# Patient Record
Sex: FEMALE | Race: WHITE | URBAN - METROPOLITAN AREA
[De-identification: names, ages, dates, MRNs, and addresses within clinical notes are randomized per-mention and may not be internally consistent; named-entity substitution may affect disease eponyms.]

---

## 2017-07-23 ENCOUNTER — HOSPITAL ENCOUNTER (INPATIENT)
Facility: CLINIC | Age: 22
LOS: 2 days | Discharge: HOME | DRG: 885 | End: 2017-07-26
Attending: EMERGENCY MEDICINE | Admitting: PSYCHIATRY & NEUROLOGY
Payer: COMMERCIAL

## 2017-07-23 DIAGNOSIS — F32.9 MAJOR DEPRESSIVE DISORDER, SINGLE EPISODE, UNSPECIFIED: ICD-10-CM

## 2017-07-23 DIAGNOSIS — R45.851 SUICIDAL IDEATION: ICD-10-CM

## 2017-07-23 PROCEDURE — 99285 EMERGENCY DEPT VISIT HI MDM: CPT | Mod: 25 | Performed by: EMERGENCY MEDICINE

## 2017-07-23 PROCEDURE — 99285 EMERGENCY DEPT VISIT HI MDM: CPT | Mod: Z6 | Performed by: EMERGENCY MEDICINE

## 2017-07-23 NOTE — IP AVS SNAPSHOT
MRN:[id number]                      After Visit Summary   7/23/2017    Sarah Cheema    MRN: [id number]           Thank you!     Thank you for choosing [facility] for your care. Our goal is always to provide you with excellent care.        Patient Information     Date Of Birth          1995        Designated Caregiver       Most Recent Value    Caregiver    Will someone help with your care after discharge? no      About your hospital stay     You were admitted on:  July 24, 2017 You last received care in the:  [facility]    You were discharged on:  July 26, 2017       Who to Call     For medical emergencies, please call 911.  For non-urgent questions about your medical care, please call your primary care provider or clinic, [phone]          Attending Provider     Provider Specialty    Bee Webster MD Emergency Medicine    [facility], Frank Swift MD Psychiatry       Primary Care Provider Office Phone # Fax #    [facility] [phone]      Further instructions from your care team       Behavioral Discharge Planning and Instructions    Summary: You were admitted to station 10 due to increased suicidal ideations. During your hospitalization, you met daily with the staff and were encouraged to attend all therapeutic programming. You met with the Clinical Treatment Coordinator and participated in your discharge planning. You are now stabilized and are discharged home.  Referrals and recommendations are listed below.   Primary Diagnosis:  Major Depressive Disorder     Major Treatments, Procedures and Findings: The patient participated in the therapeutic milieu and groups. The patient learned and practiced positive coping strategies. The patient was assessed for mental health and medication needs.  Medications were adjusted based on the identified needs.    Symptoms to Report: feeling more aggressive, increased confusion, losing more sleep, mood getting  worse or thoughts of suicide    Lifestyle Adjustment: Adjust your lifestyle to get enough sleep, relaxation, exercise and  good nutrition. Continue to develop healthy coping skills to decrease stress and promote a healthy living environment. No use of alcohol, illegal drugs or addictive medications other than what is currently prescribed. Attend all your appointments and take your medications as prescribed.    Psychiatry Follow-up:     [facility].   [address] [place] [address] [address], [place] [address]  Ph: [phone]  Fax: [phone]  [facility] TO FAX DISCHARGE INSTRUCTIONS   Psychiatric medication provider: Dr. Jesus Blum   *You have an initial appointment with Dr. Jesus Blum, scheduled for Wednesday, August 9th at 1:00 pm, however, please arrive at 12:30 pm to complete paperwork for intake appointment. Please bring insurance card and co-pay (if applicable) with you to this appointment.     [facility]   [address] [place] [address]  Ph: [phone]  [email or web address]   Therapist: Jeanette Moeller   *You have a reoccurring therapy appointment with your individual therapist, Jeanette Moeller scheduled for Mondays at 6:00 pm.      Resources:   Crisis Intervention: 963.292.5643 or 645-658-5781 (TTY: 636.389.2458).  Call anytime for help.  [place] on [place] Illness (www.[place].klarissa.org): 735.773.2517 or 516-289-5267.  Alcoholics Anonymous (www.alcoholics-anonymous.org): Check your phone book for your local chapter.  Suicide Awareness Voices of Education (SAVE) (www.save.org): 786-294-WIIR (8883)  National Suicide Prevention Line (www.mentalhealthmn.org): 122-693-FZYW (3036)  Mental Health Consumer/Survivor Network of [place] (www.mhcsn.net): 174.245.5894 or 166-746-5388  Mental Health Association of [place] (www.mentalhealth.org): 444.352.4844 or 524-428-1815    General Medication Instructions:   See your medication sheet(s) for instructions.  "  Take all medicines as directed.  Make no changes unless your doctor suggests them.   Go to all your doctor visits.  Be sure to have all your required lab tests. This way, your medicines can be refilled on time.  Do not use any drugs not prescribed by your doctor.  Avoid alcohol.    The treatment team has appreciated the opportunity to work with you.  We wish you the best in the future. If you have any questions or concerns our unit number is [phone].     Pending Results     Date and Time Order Name Status Description    7/25/2017 0030 Escitalopram Serum or Plasma In process     7/25/2017 0030 Bupropion level In process             Admission Information     Date & Time Provider Department Dept. Phone    7/23/2017 Frank Ramos MD [facility] [phone]      Your Vitals Were     Blood Pressure Pulse Temperature Respirations Height Weight    114/61 71 98.1  F (36.7  C) (Oral) 16 1.575 m (5' 2\") 64.9 kg (143 lb)    Pulse Oximetry BMI (Body Mass Index)                100% 26.16 kg/m2          Care EveryWhere ID     This is your Care EveryWhere ID. This could be used by other organizations to access your [facility] medical records  [id number]        Equal Access to Services     FELY FAYE AH: Hadii hali Mancia, waaxda luqadaha, qaybta kaalmada adecrissyyada, reanna peters. So [facility] [phone].    ATENCIÓN: Si habla español, tiene a coleman disposición servicios gratuitos de asistencia lingüística. Geoffrey al [phone].    We comply with applicable federal civil rights laws and [place] laws. We do not discriminate on the basis of race, color, national origin, age, disability sex, sexual orientation or gender identity.               Review of your medicines      CONTINUE these medicines which may have CHANGED, or have new prescriptions. If we are uncertain of the size of tablets/capsules you have at home, strength may be listed as something that might have changed.        " Dose / Directions    escitalopram 20 MG tablet   Commonly known as:  LEXAPRO   Indication:  Depression   This may have changed:    - medication strength  - how much to take   Used for:  Major depressive disorder, single episode, unspecified        Dose:  20 mg   Start taking on:  7/27/2017   Take 1 tablet (20 mg) by mouth daily   Quantity:  30 tablet   Refills:  3       lamoTRIgine 25 MG tablet   Commonly known as:  LaMICtal   Indication:  Depression   This may have changed:    - medication strength  - when to take this   Used for:  Major depressive disorder, single episode, unspecified        Dose:  50 mg   Take 2 tablets (50 mg) by mouth At Bedtime   Quantity:  60 tablet   Refills:  3         CONTINUE these medicines which have NOT CHANGED        Dose / Directions    desogestrel-ethinyl estradiol 0.15-0.02/0.01 MG (21/5) per tablet   Commonly known as:  KARIVA   Indication:  Birth Control        Dose:  1 tablet   Take 1 tablet by mouth daily   Refills:  0       fluticasone 27.5 MCG/SPRAY spray   Commonly known as:  VERAMYST        Dose:  2 spray   Spray 2 sprays into both nostrils 2 times daily   Refills:  0       fluticasone-salmeterol 250-50 MCG/DOSE diskus inhaler   Commonly known as:  ADVAIR        Dose:  1 puff   Inhale 1 puff into the lungs 2 times daily   Refills:  0       Ipratropium-Albuterol  MCG/ACT inhaler   Commonly known as:  COMBIVENT RESPIMAT        Dose:  1 puff   Inhale 1 puff into the lungs as needed for shortness of breath / dyspnea or wheezing   Refills:  0       WELLBUTRIN XL PO        Dose:  150 mg   Take 150 mg by mouth daily   Refills:  0       XYZAL PO        Dose:  10 mg   Take 10 mg by mouth daily   Refills:  0            Where to get your medicines      These medications were sent to [facility] [address] [place], [place] - [address] [place] [address]  [address] [facility] [address]     Phone:  [phone]     escitalopram 20 MG tablet    lamoTRIgine 25 MG tablet                 Protect others around you: Learn how to safely use, store and throw away your medicines at www.disposemymeds.org.             Medication List: This is a list of all your medications and when to take them. Check marks below indicate your daily home schedule. Keep this list as a reference.      Medications           Morning Afternoon Evening Bedtime As Needed    desogestrel-ethinyl estradiol 0.15-0.02/0.01 MG (21/5) per tablet   Commonly known as:  KARIVA   Take 1 tablet by mouth daily   Last time this was given:  1 tablet on 7/26/2017  9:16 AM                                   escitalopram 20 MG tablet   Commonly known as:  LEXAPRO   Take 1 tablet (20 mg) by mouth daily   Start taking on:  7/27/2017   Last time this was given:  15 mg on 7/26/2017  9:14 AM                                   fluticasone 27.5 MCG/SPRAY spray   Commonly known as:  VERAMYST   Spray 2 sprays into both nostrils 2 times daily                                      fluticasone-salmeterol 250-50 MCG/DOSE diskus inhaler   Commonly known as:  ADVAIR   Inhale 1 puff into the lungs 2 times daily                                      Ipratropium-Albuterol  MCG/ACT inhaler   Commonly known as:  COMBIVENT RESPIMAT   Inhale 1 puff into the lungs as needed for shortness of breath / dyspnea or wheezing                                   lamoTRIgine 25 MG tablet   Commonly known as:  LaMICtal   Take 2 tablets (50 mg) by mouth At Bedtime   Last time this was given:  25 mg on 7/25/2017  9:41 PM                                   WELLBUTRIN XL PO   Take 150 mg by mouth daily   Last time this was given:  150 mg on 7/26/2017  9:14 AM                                   XYZAL PO   Take 10 mg by mouth daily

## 2017-07-23 NOTE — IP AVS SNAPSHOT
[address] [address]    Phone:  [phone]                                       After Visit Summary   7/23/2017    Sarah Cheema    MRN: [id number]           After Visit Summary Signature Page     I have received my discharge instructions, and my questions have been answered. I have discussed any challenges I see with this plan with the nurse or doctor.    ..........................................................................................................................................  Patient/Patient Representative Signature      ..........................................................................................................................................  Patient Representative Print Name and Relationship to Patient    ..................................................               ................................................  Date                                            Time    ..........................................................................................................................................  Reviewed by Signature/Title    ...................................................              ..............................................  Date                                                            Time

## 2017-07-24 ENCOUNTER — APPOINTMENT (OUTPATIENT)
Dept: CT IMAGING | Facility: CLINIC | Age: 22
DRG: 885 | End: 2017-07-24
Attending: EMERGENCY MEDICINE
Payer: COMMERCIAL

## 2017-07-24 PROBLEM — R45.851 SUICIDAL IDEATION: Status: ACTIVE | Noted: 2017-07-24

## 2017-07-24 LAB
ALBUMIN SERPL-MCNC: 4.2 G/DL (ref 3.4–5)
ALBUMIN UR-MCNC: 10 MG/DL
ALP SERPL-CCNC: 37 U/L (ref 40–150)
ALT SERPL W P-5'-P-CCNC: 12 U/L (ref 0–50)
AMPHETAMINES UR QL SCN: ABNORMAL
ANION GAP SERPL CALCULATED.3IONS-SCNC: 9 MMOL/L (ref 3–14)
APAP SERPL-MCNC: NORMAL MG/L (ref 10–20)
APPEARANCE UR: ABNORMAL
AST SERPL W P-5'-P-CCNC: 8 U/L (ref 0–45)
BARBITURATES UR QL: ABNORMAL
BASOPHILS # BLD AUTO: 0 10E9/L (ref 0–0.2)
BASOPHILS NFR BLD AUTO: 0.5 %
BENZODIAZ UR QL: ABNORMAL
BILIRUB SERPL-MCNC: 0.1 MG/DL (ref 0.2–1.3)
BILIRUB UR QL STRIP: NEGATIVE
BUN SERPL-MCNC: 7 MG/DL (ref 7–30)
CALCIUM SERPL-MCNC: 9 MG/DL (ref 8.5–10.1)
CANNABINOIDS UR QL SCN: ABNORMAL
CHLORIDE SERPL-SCNC: 108 MMOL/L (ref 94–109)
CO2 BLDCOV-SCNC: 22 MMOL/L (ref 21–28)
CO2 SERPL-SCNC: 24 MMOL/L (ref 20–32)
COCAINE UR QL: ABNORMAL
COLOR UR AUTO: YELLOW
CREAT BLD-MCNC: 0.7 MG/DL (ref 0.52–1.04)
CREAT SERPL-MCNC: 0.75 MG/DL (ref 0.52–1.04)
DIFFERENTIAL METHOD BLD: NORMAL
EOSINOPHIL # BLD AUTO: 0.4 10E9/L (ref 0–0.7)
EOSINOPHIL NFR BLD AUTO: 4.4 %
ERYTHROCYTE [DISTWIDTH] IN BLOOD BY AUTOMATED COUNT: 13.5 % (ref 10–15)
ETHANOL UR QL SCN: ABNORMAL
GFR SERPL CREATININE-BSD FRML MDRD: >90 ML/MIN/1.7M2
GFR SERPL CREATININE-BSD FRML MDRD: ABNORMAL ML/MIN/1.7M2
GLUCOSE SERPL-MCNC: 110 MG/DL (ref 70–99)
GLUCOSE UR STRIP-MCNC: NEGATIVE MG/DL
HCG SERPL QL: NEGATIVE
HCT VFR BLD AUTO: 42.5 % (ref 35–47)
HGB BLD-MCNC: 14.1 G/DL (ref 11.7–15.7)
HGB UR QL STRIP: ABNORMAL
IMM GRANULOCYTES # BLD: 0 10E9/L (ref 0–0.4)
IMM GRANULOCYTES NFR BLD: 0.3 %
INR PPP: 1.06 (ref 0.86–1.14)
KETONES UR STRIP-MCNC: NEGATIVE MG/DL
LACTATE BLD-SCNC: 1.4 MMOL/L (ref 0.7–2.1)
LEUKOCYTE ESTERASE UR QL STRIP: ABNORMAL
LYMPHOCYTES # BLD AUTO: 1.9 10E9/L (ref 0.8–5.3)
LYMPHOCYTES NFR BLD AUTO: 24 %
MCH RBC QN AUTO: 28.9 PG (ref 26.5–33)
MCHC RBC AUTO-ENTMCNC: 33.2 G/DL (ref 31.5–36.5)
MCV RBC AUTO: 87 FL (ref 78–100)
MONOCYTES # BLD AUTO: 0.4 10E9/L (ref 0–1.3)
MONOCYTES NFR BLD AUTO: 5.1 %
MUCOUS THREADS #/AREA URNS LPF: PRESENT /LPF
NEUTROPHILS # BLD AUTO: 5.2 10E9/L (ref 1.6–8.3)
NEUTROPHILS NFR BLD AUTO: 65.7 %
NITRATE UR QL: NEGATIVE
NRBC # BLD AUTO: 0 10*3/UL
NRBC BLD AUTO-RTO: 0 /100
OPIATES UR QL SCN: ABNORMAL
PCO2 BLDV: 35 MM HG (ref 40–50)
PH BLDV: 7.4 PH (ref 7.32–7.43)
PH UR STRIP: 6.5 PH (ref 5–7)
PLATELET # BLD AUTO: 250 10E9/L (ref 150–450)
PO2 BLDV: 40 MM HG (ref 25–47)
POTASSIUM SERPL-SCNC: 3.2 MMOL/L (ref 3.4–5.3)
PROT SERPL-MCNC: 7.9 G/DL (ref 6.8–8.8)
RBC # BLD AUTO: 4.88 10E12/L (ref 3.8–5.2)
RBC #/AREA URNS AUTO: 2 /HPF (ref 0–2)
SALICYLATES SERPL-MCNC: 2 MG/DL
SAO2 % BLDV FROM PO2: 76 %
SODIUM SERPL-SCNC: 141 MMOL/L (ref 133–144)
SP GR UR STRIP: 1.02 (ref 1–1.03)
SQUAMOUS #/AREA URNS AUTO: 3 /HPF (ref 0–1)
URN SPEC COLLECT METH UR: ABNORMAL
UROBILINOGEN UR STRIP-MCNC: NORMAL MG/DL (ref 0–2)
WBC # BLD AUTO: 7.9 10E9/L (ref 4–11)
WBC #/AREA URNS AUTO: 6 /HPF (ref 0–2)

## 2017-07-24 PROCEDURE — 72125 CT NECK SPINE W/O DYE: CPT

## 2017-07-24 PROCEDURE — 82565 ASSAY OF CREATININE: CPT

## 2017-07-24 PROCEDURE — 80329 ANALGESICS NON-OPIOID 1 OR 2: CPT | Performed by: EMERGENCY MEDICINE

## 2017-07-24 PROCEDURE — 25000128 H RX IP 250 OP 636: Performed by: EMERGENCY MEDICINE

## 2017-07-24 PROCEDURE — 83605 ASSAY OF LACTIC ACID: CPT

## 2017-07-24 PROCEDURE — 25000132 ZZH RX MED GY IP 250 OP 250 PS 637: Performed by: PSYCHIATRY & NEUROLOGY

## 2017-07-24 PROCEDURE — 80320 DRUG SCREEN QUANTALCOHOLS: CPT | Performed by: EMERGENCY MEDICINE

## 2017-07-24 PROCEDURE — 70491 CT SOFT TISSUE NECK W/DYE: CPT

## 2017-07-24 PROCEDURE — 96360 HYDRATION IV INFUSION INIT: CPT | Performed by: EMERGENCY MEDICINE

## 2017-07-24 PROCEDURE — 84703 CHORIONIC GONADOTROPIN ASSAY: CPT | Performed by: EMERGENCY MEDICINE

## 2017-07-24 PROCEDURE — 85610 PROTHROMBIN TIME: CPT | Performed by: EMERGENCY MEDICINE

## 2017-07-24 PROCEDURE — 25000125 ZZHC RX 250: Performed by: EMERGENCY MEDICINE

## 2017-07-24 PROCEDURE — 82803 BLOOD GASES ANY COMBINATION: CPT

## 2017-07-24 PROCEDURE — 85025 COMPLETE CBC W/AUTO DIFF WBC: CPT | Performed by: EMERGENCY MEDICINE

## 2017-07-24 PROCEDURE — 81001 URINALYSIS AUTO W/SCOPE: CPT | Performed by: EMERGENCY MEDICINE

## 2017-07-24 PROCEDURE — 80053 COMPREHEN METABOLIC PANEL: CPT | Performed by: EMERGENCY MEDICINE

## 2017-07-24 PROCEDURE — 80307 DRUG TEST PRSMV CHEM ANLYZR: CPT | Performed by: EMERGENCY MEDICINE

## 2017-07-24 PROCEDURE — 70450 CT HEAD/BRAIN W/O DYE: CPT

## 2017-07-24 PROCEDURE — 12400007 ZZH R&B MH INTERMEDIATE UMMC

## 2017-07-24 PROCEDURE — 96361 HYDRATE IV INFUSION ADD-ON: CPT | Performed by: EMERGENCY MEDICINE

## 2017-07-24 PROCEDURE — 87086 URINE CULTURE/COLONY COUNT: CPT | Performed by: EMERGENCY MEDICINE

## 2017-07-24 RX ORDER — DESOGESTREL AND ETHINYL ESTRADIOL 21-5 (28)
1 KIT ORAL DAILY
Status: DISCONTINUED | OUTPATIENT
Start: 2017-07-24 | End: 2017-07-24

## 2017-07-24 RX ORDER — LAMOTRIGINE 25 MG/1
25 TABLET ORAL AT BEDTIME
Status: DISCONTINUED | OUTPATIENT
Start: 2017-07-24 | End: 2017-07-26

## 2017-07-24 RX ORDER — FLUTICASONE PROPIONATE 50 MCG
2 SPRAY, SUSPENSION (ML) NASAL 2 TIMES DAILY
Status: DISCONTINUED | OUTPATIENT
Start: 2017-07-24 | End: 2017-07-26 | Stop reason: HOSPADM

## 2017-07-24 RX ORDER — IOPAMIDOL 755 MG/ML
100 INJECTION, SOLUTION INTRAVASCULAR ONCE
Status: COMPLETED | OUTPATIENT
Start: 2017-07-24 | End: 2017-07-24

## 2017-07-24 RX ORDER — ESCITALOPRAM OXALATE 10 MG/1
10 TABLET ORAL DAILY
Status: DISCONTINUED | OUTPATIENT
Start: 2017-07-24 | End: 2017-07-24

## 2017-07-24 RX ORDER — HYDROXYZINE HYDROCHLORIDE 25 MG/1
25-50 TABLET, FILM COATED ORAL EVERY 4 HOURS PRN
Status: DISCONTINUED | OUTPATIENT
Start: 2017-07-24 | End: 2017-07-26 | Stop reason: HOSPADM

## 2017-07-24 RX ORDER — DESOGESTREL AND ETHINYL ESTRADIOL 21-5 (28)
1 KIT ORAL DAILY
COMMUNITY

## 2017-07-24 RX ORDER — DESOGESTREL AND ETHINYL ESTRADIOL 21-5 (28)
1 KIT ORAL DAILY
Status: DISCONTINUED | OUTPATIENT
Start: 2017-07-24 | End: 2017-07-26 | Stop reason: HOSPADM

## 2017-07-24 RX ORDER — OLANZAPINE 10 MG/1
10 TABLET ORAL
Status: DISCONTINUED | OUTPATIENT
Start: 2017-07-24 | End: 2017-07-26 | Stop reason: HOSPADM

## 2017-07-24 RX ORDER — LAMOTRIGINE 25 MG/1
25 TABLET ORAL DAILY
Status: DISCONTINUED | OUTPATIENT
Start: 2017-07-24 | End: 2017-07-24

## 2017-07-24 RX ORDER — ACETAMINOPHEN 325 MG/1
650 TABLET ORAL EVERY 4 HOURS PRN
Status: DISCONTINUED | OUTPATIENT
Start: 2017-07-24 | End: 2017-07-26 | Stop reason: HOSPADM

## 2017-07-24 RX ORDER — OLANZAPINE 10 MG/2ML
10 INJECTION, POWDER, FOR SOLUTION INTRAMUSCULAR
Status: DISCONTINUED | OUTPATIENT
Start: 2017-07-24 | End: 2017-07-26 | Stop reason: HOSPADM

## 2017-07-24 RX ORDER — BUPROPION HYDROCHLORIDE 150 MG/1
150 TABLET ORAL DAILY
Status: DISCONTINUED | OUTPATIENT
Start: 2017-07-24 | End: 2017-07-26 | Stop reason: HOSPADM

## 2017-07-24 RX ORDER — LORATADINE 10 MG/1
10 TABLET ORAL DAILY
Status: DISCONTINUED | OUTPATIENT
Start: 2017-07-24 | End: 2017-07-26 | Stop reason: HOSPADM

## 2017-07-24 RX ADMIN — SODIUM CHLORIDE, PRESERVATIVE FREE 60 ML: 5 INJECTION INTRAVENOUS at 01:52

## 2017-07-24 RX ADMIN — BUPROPION HYDROCHLORIDE 150 MG: 150 TABLET, FILM COATED, EXTENDED RELEASE ORAL at 12:41

## 2017-07-24 RX ADMIN — ESCITALOPRAM OXALATE 10 MG: 10 TABLET ORAL at 12:42

## 2017-07-24 RX ADMIN — LORATADINE 10 MG: 10 TABLET ORAL at 12:43

## 2017-07-24 RX ADMIN — SODIUM CHLORIDE 1000 ML: 9 INJECTION, SOLUTION INTRAVENOUS at 00:24

## 2017-07-24 RX ADMIN — FLUTICASONE FUROATE AND VILANTEROL TRIFENATATE 1 PUFF: 100; 25 POWDER RESPIRATORY (INHALATION) at 12:44

## 2017-07-24 RX ADMIN — LAMOTRIGINE 25 MG: 25 TABLET ORAL at 21:59

## 2017-07-24 RX ADMIN — FLUTICASONE PROPIONATE 2 SPRAY: 50 SPRAY, METERED NASAL at 12:42

## 2017-07-24 RX ADMIN — FLUTICASONE PROPIONATE 2 SPRAY: 50 SPRAY, METERED NASAL at 21:59

## 2017-07-24 RX ADMIN — DESOGESTREL AND ETHINYL ESTRADIOL AND ETHINYL ESTRADIOL 1 TABLET: KIT at 16:52

## 2017-07-24 RX ADMIN — IOPAMIDOL 100 ML: 755 INJECTION, SOLUTION INTRAVENOUS at 01:51

## 2017-07-24 ASSESSMENT — ENCOUNTER SYMPTOMS
ABDOMINAL PAIN: 1
FREQUENCY: 0
NECK PAIN: 0
BLOOD IN STOOL: 0
TROUBLE SWALLOWING: 0
SEIZURES: 0
SORE THROAT: 0
HEMATURIA: 0
LIGHT-HEADEDNESS: 0
NUMBNESS: 0
NECK STIFFNESS: 0
SHORTNESS OF BREATH: 0
EYE PAIN: 0
APPETITE CHANGE: 1
HEADACHES: 1
SLEEP DISTURBANCE: 1
VOMITING: 0
HALLUCINATIONS: 0
FATIGUE: 1
DECREASED CONCENTRATION: 1
DIARRHEA: 0
NAUSEA: 0
SPEECH DIFFICULTY: 0
BACK PAIN: 0
COLOR CHANGE: 0
MYALGIAS: 0
FEVER: 0
DYSURIA: 0
CONSTIPATION: 0
WEAKNESS: 0

## 2017-07-24 ASSESSMENT — ACTIVITIES OF DAILY LIVING (ADL)
DRESS: INDEPENDENT
ORAL_HYGIENE: INDEPENDENT
GROOMING: INDEPENDENT

## 2017-07-24 NOTE — ED NOTES
Health officer hold initiated at 6146. Documentation completed and placed in patients chart.  outside patient room.

## 2017-07-24 NOTE — PLAN OF CARE
"Problem: Depressive Symptoms  Goal: Depressive Symptoms  Signs and symptoms of listed problems will be absent or manageable.   Sarah Cheema is a 22 year old  female with a history of depression and asthma who presented to the [facility] ED with suicidal ideations and report of attempted hanging on the door knob of her residence.She reports a lengthy history of depression \"that has never been formally diagnosed\" but has been getting worse in the past few weeks. She reports lack of energy, \"sleeping too much\" and a hard time concentrating; she also reported poor appetite. Earlier tonight she tried to hang herself on her door knob \"I was just sick of being sick.\" Rated her depression at that that time at \"9.5; but it's 7 right now\" where ten is the worst case of clinical presentation. Her primary stressors are her recent move from [place] to [place], as well as her job; \"As my depression got worse, working as a  got unbearable.\" She denies hallucinations, denies any currentt thought or intent to harm self or others..    She denies any active medical conditions except PCOS and asthma, reports allergies to fish and nuts, both of which can cause anaphylaxis. She was cooperative with the admission process and later went to bed without any incidents. Will continue to monitor and to provide for her safety and comfort as needed.                 "

## 2017-07-24 NOTE — ED NOTES
Patient reports that she has been feeling more depressed over the past few months due to being overwhelmed with changes in her life, such as moving and medication changes. She has felt increasingly suicidal and attempted suicide today by hanging herself from a door knob. She states that if given the opportunity she would attempt to hang herself again. Health officer hold initiated at time of patient arrival, patient searched by security/staff and belongings removed from room.

## 2017-07-24 NOTE — PROGRESS NOTES
07/24/17 0606   Patient Belongings   Did you bring any home meds/supplements to the hospital?  Yes   Patient Belongings other (see comments)   Disposition of Belongings In locker and to security   Belongings Search Yes   Clothing Search Yes   Second Staff Eva Carson       In locker:  Bag  Water Bottle  2 pants  Jacket  2 shirts  Shoes  Cigarettes  Set of Keys  Underwear  Phone  Wallet    To security:  [employer]- [id number]  Visa -[phone]  Visa -[id number]    To security-medication envelope was [id number] was sent up and needed to be repackaged to [id number]. Removed no items     ADMISSION:  I am responsible for any personal items that are not sent to the safe or pharmacy. [facility] is not responsible for loss, theft or damage of any property in my possession.    Patient Signature _____________________ Date/Time _____________________    Staff Signature _______________________ Date/Time _____________________    2nd Staff person, if patient is unable/unwilling to sign  ___________________________________ Date/Time _____________________  DISCHARGE:  All personal items have been returned to me.    Patient Signature _____________________ Date/Time _____________________    Staff Signature _______________________ Date/Time ___________________

## 2017-07-24 NOTE — ED PROVIDER NOTES
"  History     Chief Complaint   Patient presents with     Suicidal     HPI  Sarah Cheema is a 22 year old female with a history of depression and asthma who presents with suicidal ideations and report of attempted hanging.     The patient reports that she has been struggling with depression for quite a while, though most recently worsening over the past 5 months; it has significantly worsened over the past week since she returned from vacation. She reports having decreased energy, decreased appetite and difficulty making decisions and concentrating. She notes that she has been sleeping frequently throughout the day. Tonight, the patient reports that things became \"overwhelming\" and she \"wanted to die.\" She states that around 8:00 PM tonight she took a string she found in her apartment and put it around her neck to strangle herself in a suicide attempt. However, when she started feeling her face swelling, she decided to remove the string and come to the ED for evaluation.     She complains of a a mild bilateral frontal headache over the past few hours, located in the bilateral \"behind my eyes\"; she has never had headaches like this in the past. She denies neck pain, sore throat, difficulty swallowing, chest pain, shortness of breath. She also denies any fever, or eye or ear complaints or new symptoms. No new numbness, tingling or focal weakness. No LOC, syncope or near syncope. She denies any recent falls or trauma outside of the suicide attempt today. She complains of lower abdominal pain for the past 2 hours. She reports she has had abdominal discomfort like this previously in her life, but not common. She denies any nausea, vomiting, diarrhea, constipation, bloody stool, dysuria, urinary frequency or urgency, hematuria, vaginal discharge, rash or skin changes.      The patient notes that she started birth control about 6-7 weeks ago for PCOS and has had 3 periods since she started this, which is abnormal for " her. She is currently spotting, with her most recent period starting last week, just the mild spotting continuiting. She denies chance of pregnancy. Last pregnancy test was ~ 1 mo ago and was negative.  No other new symptoms or complaints at this time. See ROS for further details.     The patient notes that she started following with a therapist 3 weeks ago at [facility]. She just moved to [place] 2 months ago. Her current medications include Lamictal, Wellbutrin, Xyzal, Lexapro, Advair and an oral contraceptive. She drinks alcohol twice a week, 1-3 beers. She also reports marijuana use 1-2 times per month. Denies any other drug use.     Pt aware that we would likely recommend admission, and she is willing to stay, understanding it would be in her best interested.     Past Medical History:   Diagnosis Date     Asthma      Depression        Past Surgical History:   Procedure Laterality Date     tosillectomy         No family history on file.    Social History   Substance Use Topics     Smoking status: Not on file     Smokeless tobacco: Not on file     Alcohol use Not on file     No current facility-administered medications for this encounter.      Current Outpatient Prescriptions   Medication     Levocetirizine Dihydrochloride (XYZAL PO)     fluticasone-salmeterol (ADVAIR) 250-50 MCG/DOSE diskus inhaler     LamoTRIgine (LAMICTAL PO)     Escitalopram Oxalate (LEXAPRO PO)     BuPROPion HCl (WELLBUTRIN XL PO)     Ipratropium-Albuterol (COMBIVENT RESPIMAT)  MCG/ACT inhaler     fluticasone (VERAMYST) 27.5 MCG/SPRAY spray        Allergies   Allergen Reactions     Fish      Nuts       I have reviewed the Medications, Allergies, Past Medical and Surgical History, and Social History in the Epic system.    Review of Systems   Constitutional: Positive for appetite change (decreased) and fatigue. Negative for fever.   HENT: Negative for ear discharge, ear pain, sore throat and trouble swallowing.   "  Eyes: Negative for pain and visual disturbance.   Respiratory: Negative for shortness of breath.    Cardiovascular: Negative for chest pain.   Gastrointestinal: Positive for abdominal pain (lower, mild, has had some like this before, has hx of PCOS). Negative for blood in stool, constipation, diarrhea, nausea and vomiting.   Genitourinary: Positive for vaginal bleeding (spotting (finishing most recent menstrual period)). Negative for dysuria, frequency, hematuria, urgency and vaginal discharge.   Musculoskeletal: Negative for back pain, myalgias, neck pain and neck stiffness.   Skin: Negative for color change and rash.   Allergic/Immunologic: Positive for food allergies. Negative for immunocompromised state.   Neurological: Positive for headaches. Negative for seizures, syncope, speech difficulty, weakness, light-headedness and numbness.   Psychiatric/Behavioral: Positive for decreased concentration, self-injury (see HPI), sleep disturbance (increased) and suicidal ideas (w/ plan and attempt, no HI). Negative for hallucinations.   All other systems reviewed and are negative.      Physical Exam   BP: 114/76  Pulse: 92  Temp: 98.2  F (36.8  C)  Resp: 16  Height: 157.5 cm (5' 2\")  SpO2: 98 %  Physical Exam  CONSTITUTIONAL: Well-developed and well-nourished. Awake and alert. Non-toxic appearance. No acute distress. Mentating appropriately. No findings for clinical intoxication.  HENT:   - Head: Normocephalic and atraumatic.   - Ears: Hearing and external ear grossly normal.   - Nose: Nose normal. No rhinorrhea. No epistaxis.   - Mouth/Throat: Oropharynx is clear and MMM  EYES: Conjunctivae and lids are normal. No scleral icterus. PERRL. EOMI w/o nystagmus.   NECK: Normal range of motion and phonation normal. Neck supple.  No tracheal deviation, no stridor. No edema or erythema noted. No bruits, no fullness, fluctuance, no crepitus. Very faint thin red line around the anterior/bilateral anterolateral " neck.  CARDIOVASCULAR: Normal rate, regular rhythm and no appreciable abnormal heart sounds.  PULMONARY/CHEST: Effort normal. No accessory muscle usage or stridor. No respiratory distress.  No appreciable abnormal breath sounds.  ABDOMEN: Soft, non-distended. No tenderness. No rigidity, rebound or guarding.   MUSCULOSKELETAL: Extremities warm and seemingly well perfused. No edema or calf tenderness.  NEUROLOGIC: Awake, alert. Not disoriented. She displays no atrophy and no tremor.  Normal tone. No seizure activity. Coordination normal. CNs intact. No focal deficits. GCS 15  SKIN: Skin is warm and dry. No rash noted. No diaphoresis. No pallor.   PSYCHIATRIC: Shows full range of affect. Speech and behavior normal. Thought processes linear. Cognition and memory are normal. Reports SI w/ plan and attempt earlier this evening. No HI. No hallucinations, does not appear to be responding to internal stimuli currently. No obvious manic symptoms. No apparent delusions or paranoia.     ED Course     ED Course   Value Comment By Time    Discussed w/ Trauma - They will see/evaluate the pt here in the ED. They will also review her imaging when available. Bee Webster MD 07/24 0005    Pt voluntary.  Intake holding bed for pt while we do our medical workup. Bee Webster MD 07/24 0007   Lactic Acid: 1.4 (Reviewed) Bee Webster MD 07/24 0018   Creatinine: 0.7 (Reviewed) Bee Webster MD 07/24 0018     Procedures     11:44 PM  The patient was seen and examined by Dr. Webster in Room 21.           Assessments & Plan (with Medical Decision Making)   IMPRESSION / PLAN: 22-year-old female, PMH notable for asthma and depression with notable progression of her depression of the last 5 months culminating tonight with suicidal ideation, plan and attempt at hanging protheses minor mechanism and (with a string that she found in her apartment tied to a doorknob, though vaguely without any current neck pain, no neuro symptoms  though does have a bilateral frontal headache currently.  She does have a very faint thin red line across her neck.  No evidence for airway compromise, no drooling, no crepitus him and no bruits.  Neuro exam is intact without obvious deficit.    With regards to her depression and suicidal ideation clearly she needs to be admitted for this given her attempt here tonight.  Patient is voluntary at this point and so we will contact the Behavioral Health Intake team this evening can arrange a bed presuming her medical workup any emergent injuries for which she would need to be admitted for a medical reason.    With regards the attempted hanging itself it does sound like it's a relatively minor mechanism based upon her description, she did not sustain any LOC, etc.  Her exam is grossly reassuring though she does have an objective faint erythematous line across her neck at this point which is reportedly 4 hours after the attempt.  Given her headache and objective findings on the neck I do think that we will image her and I have ordered a CT head, C-spine CT without contrast, as well as a neck soft tissue with contrast to evaluate for a more occult underlying injury. That said, my clinical suspicion on hx and exam for significant injury is quite low.  We'll also have the Trauma team evaluate her here for additional clearance prior to admitting to a Psychiatry bed    RESULTS:  - Labs: Pending  --- See ED Course section above for particular pertinent findings and comments  - Urine: No sample yet provided  - Imaging: CT Head, C-spine and Neck soft tissue ordered, not yet performed.     INTERVENTIONS:   - Trauma Consult  - C-collar placed (though quite low clinical suspician, can be cleared if CT neck negative)     DISCUSSIONS:  - w/ Trauma: They will see and evaluate the patient here in the ED.  - w/ BH Intake: They will hold a bed for the pt, presuming can be medically cleared.   - w/ Patient: I have reviewed the tentative  plan at shift change w/ pt. She is in agreement, voluntary.     SIGNOUT:  - Patient signed out to Overnight KIAN RAMEY.  - Impression at shift change: Depression, SI w/ plan; attempted hanging (minor mechanism), voluntary  - Pending at shift change: CT imaging, labs, Trauma consult  - Tentative plan: If medically cleared with labs, imaging and Trauma consult, admit to Psych (BH Intake holding a bed, Pt voluntary)    ______________________________________________________________________________    - I have reviewed the available nursing notes.      New Prescriptions    No medications on file       Final diagnoses:   None     IAbdias, am serving as a trained medical scribe to document services personally performed by Bee Webster MD, based on the provider's statements to me.   IBee MD, was physically present and have reviewed and verified the accuracy of this note documented by Abdias Mtz.   7/23/2017   [facility], [place], EMERGENCY DEPARTMENT     Bee Webster MD  07/24/17 033

## 2017-07-24 NOTE — PLAN OF CARE
Problem: Depressive Symptoms  Goal: Depressive Symptoms  Signs and symptoms of listed problems will be absent or manageable.   Outcome: No Change  Pt was unable to state a goal for today or state any barriers toward discharge.  Pt states this is her 1st mental health hospitalization. Pt did go through her current medications with writer without problem.  PT has been in her room for most of the shift. Pt did not attend group but was encouraged to go several times. PT denies paranoid thoughts and psychotic symptoms.  PT denies SI and SIB. PT rates depression 7/10 and anxiety 3/10. Pt states she has not had a appetite for months. Denies pain. Pt has recently been started on lamictal and believes her depression has worsen since starting.  Reduced her daily dose from 50QD to 25QD.  Pt smokes cigarettes but refused the need for a patch or gum.

## 2017-07-24 NOTE — PLAN OF CARE
Problem: General Plan of Care (Inpatient Behavioral)  Goal: Individualization/Patient Specific Goal (IP Behavioral)  The patient and/or their representative will achieve their patient-specific goals related to the plan of care.    The patient-specific goals include:   Outcome: No Change  Illness Management Recovery model:  Triggers.      Patient identified the following triggers which may exacerbate their illness:     1.stressfull working environment  2. na  3. na

## 2017-07-24 NOTE — ED NOTES
Cervical collar placed on patient per Dr. Webster's request to minimize movement of neck. Full spinal precautions not necessary.

## 2017-07-25 LAB
BACTERIA SPEC CULT: NORMAL
LAMOTRIGINE SERPL-MCNC: 0.9 UG/ML
Lab: NORMAL
MICRO REPORT STATUS: NORMAL
SPECIMEN SOURCE: NORMAL

## 2017-07-25 PROCEDURE — 12400007 ZZH R&B MH INTERMEDIATE UMMC

## 2017-07-25 PROCEDURE — 90853 GROUP PSYCHOTHERAPY: CPT

## 2017-07-25 PROCEDURE — 80175 DRUG SCREEN QUAN LAMOTRIGINE: CPT | Performed by: PSYCHIATRY & NEUROLOGY

## 2017-07-25 PROCEDURE — 36415 COLL VENOUS BLD VENIPUNCTURE: CPT | Performed by: PSYCHIATRY & NEUROLOGY

## 2017-07-25 PROCEDURE — 80332 ANTIDEPRESSANTS CLASS 1 OR 2: CPT | Performed by: PSYCHIATRY & NEUROLOGY

## 2017-07-25 PROCEDURE — 25000132 ZZH RX MED GY IP 250 OP 250 PS 637: Performed by: PSYCHIATRY & NEUROLOGY

## 2017-07-25 PROCEDURE — 80338 ANTIDEPRESSANT NOT SPECIFIED: CPT | Performed by: PSYCHIATRY & NEUROLOGY

## 2017-07-25 RX ADMIN — FLUTICASONE PROPIONATE 2 SPRAY: 50 SPRAY, METERED NASAL at 08:59

## 2017-07-25 RX ADMIN — BUPROPION HYDROCHLORIDE 150 MG: 150 TABLET, FILM COATED, EXTENDED RELEASE ORAL at 09:00

## 2017-07-25 RX ADMIN — ESCITALOPRAM OXALATE 15 MG: 10 TABLET ORAL at 09:00

## 2017-07-25 RX ADMIN — LAMOTRIGINE 25 MG: 25 TABLET ORAL at 21:41

## 2017-07-25 RX ADMIN — FLUTICASONE FUROATE AND VILANTEROL TRIFENATATE 1 PUFF: 100; 25 POWDER RESPIRATORY (INHALATION) at 08:59

## 2017-07-25 RX ADMIN — DESOGESTREL AND ETHINYL ESTRADIOL AND ETHINYL ESTRADIOL 1 TABLET: KIT at 08:58

## 2017-07-25 RX ADMIN — LORATADINE 10 MG: 10 TABLET ORAL at 09:00

## 2017-07-25 RX ADMIN — FLUTICASONE PROPIONATE 2 SPRAY: 50 SPRAY, METERED NASAL at 21:43

## 2017-07-25 ASSESSMENT — ACTIVITIES OF DAILY LIVING (ADL)
ORAL_HYGIENE: INDEPENDENT
GROOMING: INDEPENDENT
DRESS: INDEPENDENT
DRESS: INDEPENDENT
GROOMING: INDEPENDENT
ORAL_HYGIENE: INDEPENDENT

## 2017-07-25 NOTE — PROGRESS NOTES
Initial Psychosocial Assessment    I have reviewed the chart, met with the patient, and developed Care Plan.  Information for assessment was obtained from:     Chart review and interview with Pt.     Presenting Problem:  Pt is a 22 year old female who presented to [facility] due to increased suicidal ideations and reported she attempted to hang herself prior to coming in. Writer met with Pt who was cooperative and agreeable during interview assessment. Pt requested a new psychiatric medication provider, which new provider is located below under treatment providers. Pt signed ROIs for outpatient mental health providers.     History of Mental Health and Chemical Dependency:  Pt reported she has been diagnosed with depression for several years ago, and prior to admission it was getting worse. This is Pt's first mental health hospitalization. Pt denied any issues or history of chemical dependency use.     Family Description (Constellation, Family Psychiatric History):  Pt was raised in [place] by her mother, and has a younger brother and sister. Pt's maternal side has a history of bipolar disorder, and both sides have alcoholism.     Significant Life Events (Illness, Abuse, Trauma, Death):  Pt denies     Living Situation:  Pt lives alone in an apartment.     Educational Background:  Pt graduated college with a degree in Political Science from [employer] in [place], [place] in December of 2016.      Occupational History:  Pt was recently employed as a , but quit. Pt reported she is looking for work.     Financial Status:  Employed previously     Legal Issues:  None     Ethnic/Cultural Issues:  None     Spiritual Orientation:  None      Service History:  None     Social Functioning (organization, interests):  Interests: writing and reading   Supports: partner and best friends that live here     Current Treatment Providers are:  [facility]   [address] [place] [address].  [address]  [place] [address]  Ph: [phone]  Fax: [phone]  Psychiatric medication provider: Dr. Jesus Blum *initial appointment will be 8/9/17    [facility]   [address] [address]  Ph: [phone]  [email or web address]   Therapist: Jeanette Moeller     Social Service Assessment/Plan:  Patient has been admitted for psychiatric stabilization due to a suicide attempt by hanging. Patient will have psychiatric assessment and medication management by the psychiatrist. Medications will be reviewed and adjusted per MD as indicated. The treatment team will continue to assess and stabilize the patient's mental health symptoms with the use of medications and therapeutic programming. Hospital staff will provide a safe environment and a therapeutic milieu. Staff will continue to assess patient as needed. Patient will participate in unit groups and activities. Patient will receive individual and group support on the unit.  CTC will do individual inpatient treatment planning and after care planning. CTC will discuss options for increasing community supports with the patient. CTC will coordinate with outpatient providers and will place referrals to ensure appropriate follow up care is in place.

## 2017-07-25 NOTE — DISCHARGE INSTRUCTIONS
Behavioral Discharge Planning and Instructions    Summary: You were admitted to station 10 due to increased suicidal ideations. During your hospitalization, you met daily with the staff and were encouraged to attend all therapeutic programming. You met with the Clinical Treatment Coordinator and participated in your discharge planning. You are now stabilized and are discharged home.  Referrals and recommendations are listed below.   Primary Diagnosis:  Major Depressive Disorder     Major Treatments, Procedures and Findings: The patient participated in the therapeutic milieu and groups. The patient learned and practiced positive coping strategies. The patient was assessed for mental health and medication needs.  Medications were adjusted based on the identified needs.    Symptoms to Report: feeling more aggressive, increased confusion, losing more sleep, mood getting worse or thoughts of suicide    Lifestyle Adjustment: Adjust your lifestyle to get enough sleep, relaxation, exercise and  good nutrition. Continue to develop healthy coping skills to decrease stress and promote a healthy living environment. No use of alcohol, illegal drugs or addictive medications other than what is currently prescribed. Attend all your appointments and take your medications as prescribed.    Psychiatry Follow-up:     [facility] [place] [address]  Ph: [phone]  Fax: [phone]  [facility] TO FAX DISCHARGE INSTRUCTIONS   Psychiatric medication provider: Dr. Jesus Blum   *You have an initial appointment with Dr. Jesus Blum, scheduled for Wednesday, August 9th at 1:00 pm, however, please arrive at 12:30 pm to complete paperwork for intake appointment. Please bring insurance card and co-pay (if applicable) with you to this appointment.     [facility]   [address] [place] [id number]  Ph: [phone]  [email or web address]    Therapist: Jeanette Moeller   *You have a reoccurring therapy appointment with your individual therapist, Jeanette Moeller scheduled for Mondays at 6:00 pm.      Resources:   Crisis Intervention: 814.333.9416 or [phone] (TTY: 610.444.6829).  Call anytime for help.  National [facility] on Mental Illness (www.mn.klarissa.org): 947.226.9820 or 144-780-2528.  Alcoholics Anonymous (www.alcoholics-anonymous.org): Check your phone book for your local chapter.  Suicide Awareness Voices of Education (SAVE) (www.save.org): 037-445-JQRB (2710)  National Suicide Prevention Line (www.mentalhealthmn.org): 866-648-XDXC (1064)  Mental Health Consumer/Survivor Network of [place] (www.mhcsn.net): 658.662.1628 or 659-325-4372  Mental Health Association of [place] (www.mentalhealth.org): 385.446.7907 or 408-753-1905    General Medication Instructions:   See your medication sheet(s) for instructions.   Take all medicines as directed.  Make no changes unless your doctor suggests them.   Go to all your doctor visits.  Be sure to have all your required lab tests. This way, your medicines can be refilled on time.  Do not use any drugs not prescribed by your doctor.  Avoid alcohol.    The treatment team has appreciated the opportunity to work with you.  We wish you the best in the future. If you have any questions or concerns our unit number is [phone].

## 2017-07-25 NOTE — PLAN OF CARE
Problem: General Plan of Care (Inpatient Behavioral)  Goal: Team Discussion  Team Plan:   BEHAVIORAL TEAM DISCUSSION     Participants: Nevin Westbrook RN, Elaine MOSELEY, and Dr. Frank Ramos MD.   Progress: Initial team discussion.   Continued Stay Criteria/Rationale: Pt was admitted on a voluntary basis due to suicide attempt and increased suicidal ideation.   Medical/Physical: See medical consult.   Precautions:   Behavioral Orders   Procedures     Code 1 - Restrict to Unit     Routine Programming       As clinically indicated     Status 15       Every 15 minutes.     Suicide precautions     Plan: The plan is to assess the patient for mental health and medication needs.  The patient will be prescribed medications to treat the identified symptoms.  Upon discharge the patient will be referred to services as appropriate based on the assessment.  Rationale for change in precautions or plan: Initial plan, no change.

## 2017-07-25 NOTE — PROGRESS NOTES
07/25/17 1300   Behavioral Health   Hallucinations denies / not responding to hallucinations   Thinking intact   Orientation person: oriented;place: oriented;date: oriented;time: oriented   Memory baseline memory   Insight admits / accepts   Judgement impaired   Eye Contact at examiner   Affect blunted, flat   Mood depressed;anxious   Physical Appearance/Attire attire appropriate to age and situation   Hygiene other (see comment)  (Adequate)   Suicidality other (see comments)  (Denies)   Self Injury other (see comment)  (Denies)   Elopement (No statements/behaviors that would suggest risk)   Activity withdrawn   Speech clear;coherent   Medication Sensitivity no observed side effects;no stated side effects   Psychomotor / Gait balanced;steady   Psycho Education   Type of Intervention 1:1 intervention   Response participates, initiates socially appropriate   Hours 0.5   Treatment Detail Check in   Activities of Daily Living   Hygiene/Grooming independent   Oral Hygiene independent   Dress independent   Room Organization independent     Sarah was occasionally present in the milieu but appeared withdrawn. She did not attend groups and had a goal to journal throughout the day. She rated her depression 7/10 and anxiety 5-7/10. She denied SI/SIB and stated she was tired from lack of sleep. Pt had no further concerns at this time.

## 2017-07-25 NOTE — PROGRESS NOTES
Pt spend the beginning of the shift napping/sleeping. Pt came out of room for meals. Pt reports appetite is good, sleep is good. Pt reports depression is 8/10 and anxiety is 5 or 6/10. Pt denies     07/24/17 2200   Behavioral Health   Hallucinations denies / not responding to hallucinations   Thinking poor concentration   Orientation person: oriented;place: oriented;date: oriented;time: oriented   Memory baseline memory   Insight poor   Judgement impaired   Eye Contact at examiner   Affect blunted, flat   Mood depressed;anxious   Physical Appearance/Attire attire appropriate to age and situation   Hygiene well groomed   Suicidality other (see comments)  (denies)   Self Injury other (see comment)  (denies)   Activity withdrawn   Speech clear;coherent   Medication Sensitivity no stated side effects;no observed side effects   Psychomotor / Gait balanced;steady   Psycho Education   Type of Intervention 1:1 intervention   Response participates with encouragement   Hours 0.5   Treatment Detail check in   Activities of Daily Living   Hygiene/Grooming independent   Oral Hygiene independent   Dress independent   Room Organization independent    SI and SIB. Pt was tearful when having ! -1 with staff, stating she feels overwhelmed. Affect is flat and sad.

## 2017-07-25 NOTE — PROGRESS NOTES
"INITIAL OT ATTENDANCE:  Attended Mental Health management group focused on identifying and expressing emotions as a healthy coping skill.  Participated in group Mindfulness Yoga sequence based on Mindfulness Based Stress Reduction (MBSR) education materials. Pt demonstrated understanding through participation in 25 minute standing yoga sequence. Pt verbalized understanding by selecting positive and negative feelings from a list of emotions. Pt identified feeling \"powerful, strong, anxious\". Provided with handout for future review and use.     Pt said she has engaged in similar activities in the past & has benefited greatly from practice, so experiences a \"familiar strength\" from yoga based therapies.     Pt will be given Self Assessment form, explain the purpose of including them in their treatment plan and offer options for meeting their needs.  Additional interaction needed for assessment and plan.     "

## 2017-07-25 NOTE — H&P
"  Title:  Psychiatric Admission History, Physical Examination    Chief Complaint:  Depression, major, recurrent, with suicidal attempt.    Chemical Dependency:  Not established.    History of Present Medical Illness:  The patient is quite verbal and communicative and shows herself as a reliable and resourceful historian.    Demographic Information:  A 22-year-old female with a history that includes the followin.  Medical diagnosis of asthma since childhood.  2.  Depression.    She identifies the depression has been now present for nearly half a year, certainly 5 months plus.    In the last 1 week, it significantly heightened with intensity and intrusiveness since she returned from vacation.  Spent 5 days back in [place].    She identifies the vegetative symptoms and signs of depression including energy that is depleted to the point of anergia, inertia, with decreased appetite and difficulty \"making decisions.\"    Sleep has been showing with the paradoxical symptom of hypersomnia with frequent sleep \"throughout the day\" and reports that things have become \"overwhelming and she wanted to die.\"    Cognitive symptoms and signs of depression including hopeless, helpless, hopeless, listless, hapless.  That she had a general anhedonia, not enjoying much any part of her life or a day or week.    She identifies that she had increasingly been subject to thoughts of depression and suicide, particularly in the last 5 days.    She reflects on the previous suicidal thought idea notion at age 17; when she debated \"also hanging herself,\" but never formulated any specific plan or event.    On this occasion she \"found\" a string that was part of a boot strap; in the apartment put it around her neck to strangle herself in a suicidal attempt.  What deterred her is when she started feeling her face \"swelling,\" and the patient is surprised that the physical examination did show some area of \"excoriation\" in the neck area, anterior " "triangle.    Somatic symptoms have evolved with mild bilateral frontal headache over the past few hours located in the bilateral behind the eyes area.    Never had a headache like this before.    She identifies additionally complaining of lower abdominal pain for 2 hours.    Abdominal discomfort has previously occurred in life, but not commonly.    She started birth control 6-7 weeks ago for PCOS and has had 3 periods since she started this, which is abnormal for her.  Currently spotting with the most recent period lasting 1 week.    Previous and Past Medical and Psychiatric History and Hospitalization:  No previous psych hospitalizations.    She has had outpatient therapy through [facility].  Patient has consulted with Dr. Jeanette Vidal for outpatient treatment.    In addition, she moved to [place] 2 months back.    Her medications were restarted; initiated including new one of Lamictal increased to 50 mg q.h.s.    Wellbutrin.    Lexapro.    Has had Advair p.r.n for asthma.    Oral contraceptives.    Xyzal.    The patient identifies that chemical dependency has not been an issue.  She drinks alcohol twice a week; 1 up to 3 beers.    Marijuana use once to twice per month, but the patient insistent that this has not been an issue and that she can and will able to manage this.    She identified the stressors include the following:  That she has currently been residing in an apartment where she has been now for 2 months and the cost is 670 dollars per month.  It is in the [facility].    Was brought forth to the hospital by \"best friend\" of 5 years standing, had went to college with her, that resides in the [facility].    She had left behind in [place] a significant other that they have been together for 1 year, of a similar age and been supportive.    She had just left college December 2016, when she graduated with a major in political science; this is [employer].  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  She just found the stress unbelievable.  She felt tired all the time; felt she could not handle the pressure.  Her partner has now offered to \"bail her out\" with savings and other forms of financial arrangement.    Review of Systems:  From a medical standpoint, has been negative.    So is the constitution,but for a decrease in appetite and general fatigue.    PHYSICAL EXAMINATION:  Pulse is 92, blood pressure 114/76, temperature 98.2, respirations 16, height is 5 feet 2 inches, oxygen saturation 98.    The provisional labs have been defined.    CT of the head, CT of cervical spine, neck, soft tissues had been negative.    The patient arrived with a C-collar that has since been removed as well as consulting has been negative.     drug screen had been positive for marijuana and questionably also benzodiazepines.    SOCIAL/FAMILY/BACKGROUND HISTORY:  She identifies that in terms of family of origin, no suicide or homicide or self-injurious behavior.    In terms of chemical dependency, alcoholism, alcoholism both mother's and father's side.  The mother side has been beset with bipolar disorder being diagnosed in 3 or 4 sibs.    She identifies the parents in their 40s.  She is younger sibs, a brother age 18 at home and a sister 20.    She does not identify any issue of her formal mental health as indicated.    Previous Trial of Medication:  Been on Wellbutrin at 150 mg, at one point was increased to 300, improved to escalate into panic attacks;  she was not able to go to work, therefore was tapered back.    Lexapro and citalopram at 10 mg q.a.m. for 1 " "year.    Lamictal now for 7 or so days.      Allergies:  To fish and nuts.    THE SUICIDE ATTEMPT:  As part of the patient's current evaluation, the patient reiterates that the string was incidental, was found in the apartment, and that secondly she used the doorknob and interrupted  and is prepared to sign a contract to \"protect herself.\"    Review of Systems from a Psychiatric Standpoint:  The mood disorder, depression.  No apollo.    Chemical dependence has been defined.    Suicide or homicide self-injurious behavior has been delineated.    Psychotic symptoms, hallucinations, delusions absent.     appearance; eye contact is sustained.  Gait is unimpaired.  Movement shows no abnormality and her level of activity is normal.    General behavior showed to be fully cooperative and speech is normal rate, rhythm and volume.  Mood is certainly one of profound depression general dysphoria and affect is certainly one of being defeated and thus is appropriate.    Sensorium she is alert and oriented in all 3 spheres, person, place and time.  Concentration is fair.  Memory recent, remote remains intact.  Intellectual functioning showed no distinct effort and difficulties. Abstractability remained good and thought processes are well organized.  Associations remain intact.  Thought content:  Psychotic symptoms, hallucinations, delusions absent.  Suicide has been defined.  Aggression has been  absent.  Insight, judgment, foresight, and planning has been defined.    Additional Social and Background History:  Identified to the patient that they are variables that she can control and that, therefore, includes anything that disinhibits her and diminishes her ability for control, thus alcohol and drugs and that she needs to introduce sobriety.    Asked to revisit the stress; she already has quit her job, and certainly feels frustrated because she is an individual who has been majoring in political science and certainly has too " sophisticated of work for her level of adjustment.    To contract with herself regarding suicide and a safety protocol.    And to reach out to 911 if 1 or 2 individuals that she can trust with her decisions are not available.    Do a family tree of the family of origin in terms of past mental health issues and suicide, homicide or psychotic symptoms.    To introduce some more of the activities including music or sports, painting, or culinary skills; anything along the lines that she feels would promote and engender her recreational spirits.    Additionally, the patient to reconnect and have updated mental health providers.    Additional treatment in the hospital included the followin.  The patient is on a voluntary basis.  2.  Code 1 and 15-minute check.  3.  Diet:  Regular diet.  Lab:  Blood levels of Lamictal and also bupropion or Wellbutrin and serum citalopram.  4.  The patient is on a voluntary basis.  5.  Precautions including suicide precaution.    Medication to reiterate:    1.  Started back on Wellbutrin XL at 150 mg 1 q.a.m.  2.  Lexapro 10 mg.  Will increase to 15 mg q.a.m.  3.  Lamictal reduced to 25 mg and this has been modified to a q.h.s. dose.  The patient is quite specific.  4.  Continue Claritin 10 mg q.a.m.  5.  Kariva birth control.  6.  Flonase is still available.  7.  Ellipta.    CC:  [facility], attention Jeanette Vidal.    CC:  [facility] in [place].    CC:  Frank Ramos MD.        Frank aRmos MD    D:  2017 17:09 T:  2017 22:44  Document:  [id number] DP\\

## 2017-07-25 NOTE — PROGRESS NOTES
Writer spoke with Pt's therapist, Jeanette ([phone]) informed her of events leading to admission, and discussed discharge plans. Pt has a reoccurring appointment with Jeanette on Mondays at 6pm. Writer stated she would contact Jeanette on Friday, and let her know the plan for Pt whether she will stay the weekend or has discharged, etc.

## 2017-07-25 NOTE — PLAN OF CARE
Problem: General Plan of Care (Inpatient Behavioral)  Goal: Individualization/Patient Specific Goal (IP Behavioral)  The patient and/or their representative will achieve their patient-specific goals related to the plan of care.    The patient-specific goals include:   Personal Plan of Care     Reasons you are in the Hospital  1. Unbearably depressed   2. No appetite/lethargic-sleepy   3. Suicidal ideations   4.     Goals for Discharge   1. Normal, chemically balanced   2. Appointments for mental health providers  3. Re-establishing writing as an outlet

## 2017-07-25 NOTE — PROGRESS NOTES
Writer called "[facility] and scheduled the following appointment for Pt:  [facility]   [address] [place] [address]  [address] [address]  Ph: [phone]  Fax: [phone]  HUC TO FAX DISCHARGE INSTRUCTIONS   Psychiatric medication provider: Dr. Jesus Blum   *You have an initial appointment with Dr. Jesus Blum, scheduled for Wednesday, August 9th at 1:00 pm, however, please arrive at 12:30 pm to complete paperwork for intake appointment. Please bring insurance card and co-pay (if applicable) with you to this appointment.     Writehiwot MEYER with [facility], asked for a return call back to schedule an appointment with Pt's therapist.

## 2017-07-26 VITALS
WEIGHT: 143 LBS | RESPIRATION RATE: 16 BRPM | OXYGEN SATURATION: 100 % | BODY MASS INDEX: 26.31 KG/M2 | DIASTOLIC BLOOD PRESSURE: 61 MMHG | HEART RATE: 71 BPM | HEIGHT: 62 IN | TEMPERATURE: 98.1 F | SYSTOLIC BLOOD PRESSURE: 114 MMHG

## 2017-07-26 PROCEDURE — 25000132 ZZH RX MED GY IP 250 OP 250 PS 637: Performed by: PSYCHIATRY & NEUROLOGY

## 2017-07-26 RX ORDER — LAMOTRIGINE 25 MG/1
50 TABLET ORAL AT BEDTIME
Qty: 60 TABLET | Refills: 3 | Status: SHIPPED | OUTPATIENT
Start: 2017-07-26

## 2017-07-26 RX ORDER — ESCITALOPRAM OXALATE 20 MG/1
20 TABLET ORAL DAILY
Status: DISCONTINUED | OUTPATIENT
Start: 2017-07-27 | End: 2017-07-26 | Stop reason: HOSPADM

## 2017-07-26 RX ORDER — LAMOTRIGINE 25 MG/1
50 TABLET ORAL AT BEDTIME
Status: DISCONTINUED | OUTPATIENT
Start: 2017-07-26 | End: 2017-07-26 | Stop reason: HOSPADM

## 2017-07-26 RX ORDER — ESCITALOPRAM OXALATE 20 MG/1
20 TABLET ORAL DAILY
Qty: 30 TABLET | Refills: 3 | Status: SHIPPED | OUTPATIENT
Start: 2017-07-27

## 2017-07-26 RX ADMIN — LORATADINE 10 MG: 10 TABLET ORAL at 09:14

## 2017-07-26 RX ADMIN — FLUTICASONE PROPIONATE 2 SPRAY: 50 SPRAY, METERED NASAL at 09:13

## 2017-07-26 RX ADMIN — BUPROPION HYDROCHLORIDE 150 MG: 150 TABLET, FILM COATED, EXTENDED RELEASE ORAL at 09:14

## 2017-07-26 RX ADMIN — ESCITALOPRAM OXALATE 15 MG: 10 TABLET ORAL at 09:14

## 2017-07-26 RX ADMIN — DESOGESTREL AND ETHINYL ESTRADIOL AND ETHINYL ESTRADIOL 1 TABLET: KIT at 09:16

## 2017-07-26 RX ADMIN — FLUTICASONE FUROATE AND VILANTEROL TRIFENATATE 1 PUFF: 100; 25 POWDER RESPIRATORY (INHALATION) at 09:13

## 2017-07-26 ASSESSMENT — ACTIVITIES OF DAILY LIVING (ADL)
ORAL_HYGIENE: INDEPENDENT
ORAL_HYGIENE: INDEPENDENT
DRESS: INDEPENDENT;SCRUBS (BEHAVIORAL HEALTH)
DRESS: INDEPENDENT
GROOMING: INDEPENDENT;SHOWER;PROMPTS
GROOMING: INDEPENDENT
LAUNDRY: WITH SUPERVISION

## 2017-07-26 NOTE — PROGRESS NOTES
"   07/25/17 2156   Behavioral Health   Hallucinations appears responding   Thinking poor concentration   Orientation time: oriented;date: oriented;place: oriented;person: oriented   Memory baseline memory   Insight poor   Judgement impaired   Eye Contact at examiner   Affect blunted, flat   Mood mood is calm   Physical Appearance/Attire attire appropriate to age and situation   Hygiene neglected grooming - unclean body, hair, teeth   Suicidality other (see comments)  (denied)   Self Injury other (see comment)  (denied)   Elopement (nothing was observeed)   Activity other (see comment)  (visible in the milieu )   Speech coherent;clear   Medication Sensitivity no observed side effects;no stated side effects   Psychomotor / Gait balanced;steady   Psycho Education   Type of Intervention 1:1 intervention   Response participates, initiates socially appropriate   Hours 0.5   Treatment Detail check in    Activities of Daily Living   Hygiene/Grooming independent   Oral Hygiene independent   Dress independent   Room Organization independent   Activity   Activity Level of Assistance independent   Behavioral Health Interventions   Depression maintain safety precautions;monitor need to revise level of observation;maintain safe secure environment   Social and Therapeutic Interventions (Depression) encourage socialization with peers;encourage effective boundaries with peers;encourage participation in therapeutic groups and milieu activities     Pt was visible in the milieu and attended groups. Pt denied SI or SIB today. \"Pt goal today is to be visible and awake\"  "

## 2017-07-26 NOTE — PROGRESS NOTES
Writer BETSY Reid, Pt's therapist ([phone]) informed her of discharge later this afternoon, asked for a call back if there are any questions or concerns.

## 2017-07-26 NOTE — PLAN OF CARE
Problem: Depressive Symptoms  Goal: Depressive Symptoms  Signs and symptoms of listed problems will be absent or manageable.   Patient has been calm but engaged on approach. No behavioral outburst or combative behavior. Receptive to staff redirection. Staying in his room and isolative. Denied SI and HI. No overt psychosis, apollo or confusion. Sleeping well. Compliant with medications. Will leave as soon as her ride is here.

## 2017-07-26 NOTE — PROGRESS NOTES
07/26/17 1121   Behavioral Health   Hallucinations denies / not responding to hallucinations   Thinking poor concentration   Orientation time: oriented;date: oriented;place: oriented;person: oriented   Memory baseline memory   Insight insight appropriate to situation;other (see comment)  (IMR)   Judgement impaired   Eye Contact at examiner   Affect blunted, flat   Mood mood is calm;depressed   Physical Appearance/Attire disheveled;appears stated age   Hygiene neglected grooming - unclean body, hair, teeth;other (see comment)  (shower)   Suicidality other (see comments)  (Denies)   Self Injury other (see comment)  (Denies )   Activity other (see comment);withdrawn  (Sleeping/Napping)   Speech clear;coherent   Medication Sensitivity no observed side effects;no stated side effects   Psychomotor / Gait steady;balanced     Pt.'s goal is to talk to a doctor. Pt.'s discharge plan is home today and plans to ask a doctor. Pt. Appeared calm and depressed. Pt. Was sleeping/napping. Pt. Had a flat affect. Pt. Did not attend groups. Pt. Denies SI and SIB. Pt. Needed prompting to take a shower. Pt.'s stated effective approaches was sleeping and medication change. Pt. Stated being hopeful about medication change. Pt.'s IMR of Warning Signs was lose appetite, being crabby, and being irritable. Pt. Did not have breakfast as was sleeping. Pt. Reports sleep as poor. Pt. Did not state pain.

## 2017-07-26 NOTE — PROGRESS NOTES
discussed possible discharge today with Pt. Discussed aftercare treatment options, informed Pt of our adult outpatient programs. Pt stated PHP would work best with her schedule, and she would like for  to make a referral.      completed internal referral in [facility] for Adult PHP program.

## 2017-07-27 NOTE — DISCHARGE SUMMARY
"  A 22-year-old female.     History of Present Medical Illness:    1.  Depression major recurrent.  2.  Present since childhood.  3.  No chemical dependency.    The patient identifies escalation in the last 5 days.  She had been to [place] and when she returned, she was beset with the following.    1.  Cognitive symptoms and signs of depression and feeling hopeless, helpless, copeless, worthless..  2.  General anhedonia.  3.  Vegetative symptoms and signs of depression with suicidal ideation and intention.    Suicidal thoughts \"hanging herself\" use a boot strap which she found in the apartment and interrupted this by her own accord.    At age 17 had similar thought but never pursued it.    Previous and past medical and psychiatric history:  Outpatient therapy only with the patient returning to these outpatient therapists as in the discharge recommendation.      Diagnosis DSM 5/ICD 10:  Includes the following.    1.  Depression major recurrent.  2.  Chemical dependency not identified.    Treatment:  Patient being discharged with 1 month supply of medication has majority of it including the following.    1.  Wellbutrin  mg every morning.   2.  Kriva 1 daily.   3.  Lexapro 20 mg 1 every morning.    4.  Flonase nasal spray twice a day.  5.  Breo Ellipta 100/ 25 mg 1 puff daily.  6.  Lamictal 50 at bedtime.  7.  Claritin 10 milligrams at bedtime.     Outpatient discharge appointments have been set with the following.    1.  Psychiatry,, [address] [place] [address].  Phone [phone], [phone].  2.  Psychiatric Medicine, Dr. Jesus Blum, Wednesday, 08/09 at 1 p.m.   3.  Sp, psychotherapy in [facility], [address] [place] [id number], telephone [phone].  4.  Info at Therapy.    5.  Therapist, Jeanette Moeller.        Frank Ramos MD    D:  07/26/2017 16:15 T:  07/27/2017 00:14  Document:  [id number]  "

## 2017-07-29 LAB
BUPROPION SERPL-MCNC: 15 NG/ML
ESCITALOPRAM SERPL-MCNC: 17.1 UG/ML
NORCITALOPRAM SERPL-MCNC: NORMAL UG/ML
OH-BUPROPION SERPL-MCNC: 315 NG/ML

## 2017-08-07 ENCOUNTER — HOSPITAL ENCOUNTER (OUTPATIENT)
Dept: BEHAVIORAL HEALTH | Facility: CLINIC | Age: 22
End: 2017-08-07
Attending: PSYCHIATRY & NEUROLOGY
Payer: COMMERCIAL

## 2017-08-07 ASSESSMENT — PAIN SCALES - GENERAL: PAINLEVEL: NO PAIN (0)

## 2017-08-07 NOTE — PROGRESS NOTES
Standard Diagnostic Assessment     CLIENT'S NAME: Sarah Cheema  MRN:   [id number]  :   1995 AGE:22 year old SEX: female  ACCT. NUMBER: [id number]  DATE OF SERVICE: 17 Start Time:  10:15 End Time:  11:45      Home Phone [phone]   Work Phone Not on file.   Mobile Not on file.     Preferred Phone: Above is cell.  May we leave a program related message? yes    No, the patient has been informed that any other mental health professional providing mental health services to me will need access to this Diagnostic Assessment in order to develop a treatment plan and receive payment.     Identifying Information:  Sarah Cheema is a 22 year old, White, single female. Sarah attended the   alone.     Reason for Referral: Sarah was referred to [facility] ()  by Station 10, inpatient psychiatric unit at [facility]. Sarah reports the reason for referral at this time is elevated depression with anxiety symptoms that are impairing her functioning level. Prior to inpatient hospitalization, she was having suicidal ideation with plan to hang self. She called her best friend and she came to the ER.    Sarah verbalizes the following treatment/discharge goals: stronger routine, second opinion about my medications, having a job I enjoy, strategies to lessen the depression.    Current Stressors/Losses/Disappointments: recent move to the [facility], graduated from college in [place], family stressors with her father. She was recently taken off Lamictal due to rash. There is a family history of Bipolar Disorder. (She denies having gabbi, impulsive spending or behaviors, taking on lots of projects, psychosis or little need for sleep.)     Per Client, Review of Symptoms:  Mood (Depression/Anxiety/Gabbi/Anger): depressed mood, anxious mood with panic attacks, irritability, hopelessness, helplessness.   (Last panic attack was a month ago)  Thoughts: worry, rumination   Concentration/Memory: poor concentration,  poor memory   Appetite/Weight: (see also, Physical Health Screening below) low appetite, #15 weight loss   Sleep: increased need for sleep (12-14 hours per day)    Motivation/Energy: low motivation, low energy  Behavior: social isolation     Psychosis: denies     Trauma: denies   Other: clinched jaw, nervousness especially in public, shakiness at times, sweatiness at times, shortness of breath, feeling lethargic, low self esteem, negative self talk, tearfulness, feelings of guilt, more difficult to make decisions, difficulty with eating and showering, anhedonia, difficulty being social,  recent suicidal ideation resulting an inpatient hospitalization.    Mental Health History:  Sarah reports first onset of mental health symptoms was in childhood with depression. Her parents got  when she was age 4.  Sarah was first diagnosed Depression.   Sarah received the following mental health services in the past: counseling and inpatient mental health services. She has been consulting with therapist, Jeanette Moeller at [facility] in [facility] since June 2017.  She has been consulting with a nurse practioner who has been managing her psychotropic medication at the [facility] in [place]. In [place], she was seeing a therapist weekly from May 2016-December 2016 as well as a psychiatrist.  Has seen therapists on/off since childhood.  Psychiatric Hospitalizations: [facility]  Station 10 for mood stabilization of depression symptoms and suicidal ideation with thoughts to hang self. She has never attempted suicide..   Sarah denies a history of civil commitment.      Onset/Duration/Pattern of Symptoms noted above:   She noticed the depression symptoms began to elevate in February. She has had several transitions: graduating from college in [place] in December; moving back to [place] and resides with her mom in January to mid-May; moving to the [place] area in  mid-May; partner who is her main support continues to reside in [place]; left job a few weeks ago due to poor functioning. She reports mood symptoms began to elevate in July 2017 with low energy, difficulty making decisions, tearfulness, lethargic, difficulty with eating and showering, difficulty working so she quit her job.  Prior to inpatient hospitalization began to feel like she didn't want to be alive anymore. Had thoughts to hang self. She had tearfulness. Anxiety symptoms as well. Last panic attack was one month ago (shortness of breath, sweatiness, nervousness, shakiness, worry, ruminating thoughts).    Sarah reports the following understanding of her diagnosis: Major Depression.      Personal Safety:    Are you depressed or being treated for depression? yes   Have you ever thought about hurting yourself (SIB) now or in the past? no     Have you ever thought about suicide now or in the past? Had recent suicidal ideation with plan to hang self. She called a friend. She went to the ER. She has not had suicidal ideation in a one week. Denies suicide attempts.     Do you have a gun, weapons or other means (including medications) to harm yourself available to you? No   Have any of your family members or friends attempted or completed suicide? (If yes, Who, When, How) no     Do you take chances with your safety?   no   Have you currently or in the past had trouble with physical aggression (If yes, describe)? no     Have you ever thought about killing someone else? No   Have you ever heard voices? No       Supports:   From whom do you receive support? (family/friends/agency) Best friend, Jeanette and partner     How often do you have contact with them? Daily contact with partner via phone. Sees and talks with her best friend regularly.     Do your support people want/need education/resources? no        Is there anything in your life (current or history) that is satisfying to you (include leisure interests/hobbies)?    Yes-spending time with best friend. Enjoys reading.      Hope/Belief System:  Do you think things can get better? yes     Rate how strongly you believe things can get better:   (Scale 1-5; 1=no belief; 5=Very Strong Belief)    4    What would make it better?  Second opinion about medication, get coping skills, create a schedule for structure and get a job.   What gives you hope?    My partner       Personal Safety Summary:  After gathering the above information, Sarah  presents the following high risk factors for suicide: Hopelessness, Worthlessness.  Sarah denies current fears or concerns for personal safety.    Sarah has the following Protective Factors: Sense of responsibility to family, Positive social support and Positive therapeutic releationships      Upon review of the patient interview and identification of high risk factors determine individualized safety strategies alternatives and treatment plan interventions. Client consented to co-developed safety plan, which includes coping skills, community resources including crisis numbers..  Client voluntarily presented to ED for evaluation.       Substance Use History:   Denies use or abuse of alcohol or illicit chemicals.    Substance Use Disorder Treatment: Sarah is currently receiving the following services: No indications of CD issues.       CAGE-AID:  Have you ever felt you ought to cut down on your drinking or drug use?   No    Have people annoyed you by criticizing your drinking or drug use?   No    Have you ever felt bad or guilty about your drinking or drug use?   No    Have you ever had a drink or used drugs first thing in the morning to steady your nerves or to get rid of a hangover?  No    Do you feel these issues have been adequately addressed?   Yes. How? Not using    Chemical Dependency Assessment Recommended?  No          Sarah has a negative Cage-Aid score.       Legal History:    Sarah reports that she has not been involved  with the legal system.   ________________________________________________________________________    Life Situation (Employment/School/Finances/Basic Needs):  Sarah  is currently living alone in a studio apartment in [place].   The safety/stability of this environment is described as: safe.    Sarah is currently unemployed: left her job a few weeks ago due to mood symptoms. She is seeking new employment. Applying to temp agencies.   Sarah describes a work Hx of being a  at [employer].    Sarah reports finances are obtained through partner who is assisting with rent this month.  Sarah does not identify her finances as a current stressor.  Sarah denies a history of gambling and denies a history of gambling treatment.     Sarah reports her highest level of education is college graduate from a college in [place] this past fall. 2012 graduated from high school in [place].  Sarah did not identify any learning problems   Sarah describes academic performance as: beginning of college had bad grades. The end of college had really good grades.   Sarah describes school social experience as: active. She was President of [employer]. She was involved in the  [employer].     Sarah denies concerns regarding her current ability to meet basic needs.     Social/Family History:  Sarah  reports she grew up in [place]..   Sarah was the first born of 3 children. She has a brother age 18 and sister age 20.  Sarah reports her biological parents are . Parents got  when she was age 4. Her father got remaried and resides in [facility]. They have fought a lot and dont get along. Her father is mean. They talk 1x every other month. He works as a  at a [employer].  Her mother is a lunch lady. They get along.   She gets along with her siblings.  Sarah identifies her relationship status as: partnered / significant other. She has a  roman of one year, Trino.   Sarah identifies her sexual orientation as: same sex. She reports a polyamorous lifestyle.    Sarah denies sexual health concerns.   Sarah reports having 0 children.     Sarah describes the quantity/quality of her social relationships as limited. She has a best friend who moved to the [facility] which is the reason she moved here. The cost of living is better than [place]. Her partner, Trino resides in [place]. She hopes to expand her social support group.         Significant Losses / Trauma / Abuse / Neglect Issues / Developmental Incidents:  Sarah denies significant loss/trauma/abuse/neglect issues/developmental incidents   Sarah reports disagreements with her father. They have limited contact.  Sarah denies personal  experience.     Mandaen Preference/Spiritual Beliefs/Cultural Considerations:    A. Ethnic Self-Identification:  Sarah self-identifies her race/ethnicities as:  and her preferred language to be English.   Sarah reports she does not need the assistance of an . Sarah  reports she does not need other support or modifications involved in therapy.      B. Do you experience cultural bias (the practice of interpreting judging behavior by standards inherent to one's own culture) by other people as a stressor? If yes, describe how this relates to overall mental health symptoms.  No    C. Are there any cultural influences that may need to be considered for your treatment?  (This includes historical, geographical and familial factors that affect assessment and intervention processes). No, Denies any cultural influences or concerns that need to be considered for treatment. She defines herself an Athiest.     Strengths/Vulnerabilities:   Sarah identifies her personal strengths as: caring, educated, empathetic, goal-focused, has a previous history of therapy, insightful, intelligent, motivated, open to learning, support of  family, friends and providers, wants to learn, willing to ask questions and work history .   Things that may interfere with the clients success in treatment include: few friends and financial hardship.   Other identified areas of vulnerability include: Suicidal Ideation  Anxiety with/without panic attacks  Depressive symptoms.     Medical History / Physical Health Screen:     Primary Care Physician: Sarah goes to [facility] in [place]..   Last Physical Exam: within the past year. Client was encouraged to follow up with PCP if symptoms were to develop.    Mental Health Medication Management Provider / Psychiatrist: Sarah reports not having a psychiatrist.  She was scheduled to have a first appointment with Dr. Car Blum at [facility] on Wednesday, August 9 at 1:00 pm.    Last visit: She saw Dr. Ramos when on Station 10 at [facility].        Next visit: She will see Dr. Jesus Blum when she starts the partial program on 8/9/17.     Current medications including prescription, non-prescription, herbals, dietary aids and vitamins:  Per client report: Outpatient Prescriptions Marked as Taking for the 8/7/17 encounter (Hospital Encounter) with Sada Shah [facility]   Medication Sig     escitalopram (LEXAPRO) 20 MG tablet Take 1 tablet (20 mg) by mouth daily     desogestrel-ethinyl estradiol (KARIVA) 0.15-0.02/0.01 MG (21/5) per tablet Take 1 tablet by mouth daily     Levocetirizine Dihydrochloride (XYZAL PO) Take 10 mg by mouth daily     fluticasone-salmeterol (ADVAIR) 250-50 MCG/DOSE diskus inhaler Inhale 1 puff into the lungs 2 times daily     BuPROPion HCl (WELLBUTRIN XL PO) Take 150 mg by mouth daily     Ipratropium-Albuterol (COMBIVENT RESPIMAT)  MCG/ACT inhaler Inhale 1 puff into the lungs as needed for shortness of breath / dyspnea or wheezing     fluticasone (VERAMYST) 27.5 MCG/SPRAY spray Spray 2 sprays into both nostrils 2 times daily       Sarah reports current  medications are: Not effective: continues to have depression symptoms.   Sarah describes taking her medications as: Independent.  Sarah reports taking prescribed medications as prescribed.     Sarah provides the following current assessment of pain:  ; Pain Score: No Pain (0);  .     Sarah provides the following information regarding past significant medical conditions/diagnoses:      Medical:  Past Medical History:   Diagnosis Date     Asthma      Depression        Surgical:  Past Surgical History:   Procedure Laterality Date     tosillectomy       Allergy:   Sarah reports   Allergies   Allergen Reactions     Fish Anaphylaxis     Can eat shell fish     Nuts Anaphylaxis     Can eat almonds        Family History of Medical, Mental Health and/or Substance Use problems:  Per client report:   Family History   Problem Relation Age of Onset     Bipolar Disorder Mother      Bipolar Disorder Maternal Grandmother      Bipolar Disorder Maternal Grandfather        Sarah reports no current medical concerns.      General Health:   Have you had any exposure to any communicable disease in the past 2-3 weeks? no     Are you aware of safe sex practices? yes     Is there a possibility of pregnancy?  no       Nutrition:    Are you on a special diet? If yes, please explain:  no   Do you have any concerns regarding your nutritional status? If yes, please explain:  no   Have you had any appetite changes in the last 3 months?  No     Have you had any weight loss or weight gain in the last 3 months?  Yes, how much? 15#     Do you have a history of an eating disorder? no   Do you have a history of being in an eating disorder program? no   NOTE: BMI to be calculated following program admission.    Fall Risk:   Have you had any falls in the past 3 months? no     Do you currently useany assistive devices for mobility?   no     NOTE: If client reports 3 or more falls in the past 3 months, the client will not be accepted into  the program until further assessment is completed by the program nurse. Check if a nurse is available to assess at time of DA.    NOTE: If client reports 2 falls in the past 3 months and/or the client currently uses assistive devices for mobility, the  will send an in-basket to the program nurse to meet with the client within the first week of programming.    Head Injury/Trauma:   Do you have a history of head injury / trauma? no     Do you have any cognitive impairment? no       Per completion of the Medical History / Physical Health Screen, is there a recommendation to see / follow up with a primary care physician/clinic?    No.      Clinical Findings     Mental Status Assessment/Clinical Observation:  Appearance:   awake, alert and adequately groomed  Eye Contact:   good  Psychomotor Behavior: Normal  no evidence of tardive dyskinesia, dystonia, or tics  Attitude:   Cooperative    Oriented to:   All    Speech   Rate / Production: Normal    Volume:  Normal   Mood:    Anxious  Depressed     Affect:    Appropriate      Thought Content:  Clear  no evidence of suicidal ideation or homicidal ideation, no evidence of psychotic thought, no auditory hallucinations present and no visual hallucinations present  Thought Form:  logical no loose associations  Insight:    good    Judgment:     intact  Attention Span/Concentration: intact  Recent and Remote Memory:  intact      Psychiatric Diagnosis:    296.32 (F33.1) Major Depressive Disorder, Recurrent Episode, Moderate _ and With anxious distress  300.02 (F41.1) Generalized Anxiety Disorder    Provisional Diagnostic Hypothesis (Explain R/O, other Provisional Diagnosis, and why alternative Diagnosis that were considered were ruled out):   None noted    Medical Concerns that may Impact Treatment:   None noted    Psychosocial and Contextual Factors (V-Codes):  V62.29 Other problem related to employment currently unemployed. let go of job due to mood symptoms a few weeks  ago. and V61.20 Parent-child relational problem Distress with father. Have limited contact.     WHODAS 2.0 SCORE: 94.9/100 %  (Total=32)        Client and family participation in assessment:   Sarah was alone during this assessment.   This assessment does not include collateral information.      Summary & Recommendations  Provide a brief summary of how diagnostic criteria is met (symptoms, duration & functional impairment), cause, prognosis, and likely consequences of symptoms. Include overview of pertinent client strengths, cultural influences, life situations, relationships, health concerns and how diagnosis interacts/impacts with client's life. Recommendations include: client preferences, prioritization of needed mental health, ancillary or other services and any referrals to services required by statute or rule.     Sarah is a  22 year old  female who was referred to the partial program as part of her discharge plan from Station 10 at [facility]. She was hospitalized for x3 days. She reports recent elevation in depression and anxiety symptoms that are impairing her functioning level to the point she left her job and has had difficulty with ADLs and self care.  She noticed the depression symptoms began to elevate in February. She has had several transitions: graduating from college in [place] in December; moving back to [place] and resides with her mom in January to mid-May; moving to the [place] area in mid-May; partner who is her main support continues to reside in [place]; left job a few weeks ago due to poor functioning. She reports mood symptoms began to elevate in July 2017 with low energy, difficulty making decisions, tearfulness, lethargic, difficulty with eating and showering, difficulty working so she quit her job.  Prior to inpatient hospitalization began to feel like she didn't want to be alive anymore. She had thoughts to hang self. She had tearfulness. She reports anxiety symptoms as well  with her last panic attack being one month ago. She endorses  shortness of breath, sweatiness, nervousness, shakiness, worry, and rumination. She reports depressed mood, tearfulness, poor concentration, poor memory, low self esteem, negative self talk, increased need for sleep, no appetite with 15# weight loss,   clinched jaw, nervousness especially in public, feelings of guilt,  anhedonia, and difficulty being social. She is motivated for treatment and to learn new ways of coping to manage the mood symptoms. She is bright, intelligent and has a supportive partner and best friend.  Sarah would benefit from education regarding symptom and sensory regulation for mood stabilization, tools to cope with transitions, cognitive behavioral skills to reframe negative self talk and self esteem building as she is seeking employment to reduce financial stressors. She can continue consulting with therapist, Jeanette Moeller at [facility] in [facility] and the  nurse practioner who has been managing her psychotropic medication at the [facility] in [place].         Prognosis is unknown. Without the recommended intervention, the client is likely to experience the following consequences of their symptoms: a higher level of care would needed due to difficulty functioning.    Referrals to services required by statute or rule:   Report to child/adult protection services was NA.   Referral to another professional/service is not indicated at this time..    Program Recommendation: [facility] () .      Assessment Completed by: Sada Shah, LUCILA, [facility]

## 2017-08-09 ENCOUNTER — HOSPITAL ENCOUNTER (OUTPATIENT)
Dept: BEHAVIORAL HEALTH | Facility: CLINIC | Age: 22
End: 2017-08-09
Attending: PSYCHIATRY & NEUROLOGY
Payer: COMMERCIAL

## 2017-08-09 VITALS — HEIGHT: 62 IN | BODY MASS INDEX: 26.87 KG/M2 | WEIGHT: 146 LBS

## 2017-08-09 PROBLEM — F33.9 MAJOR DEPRESSIVE DISORDER, RECURRENT EPISODE WITH ANXIOUS DISTRESS (H): Status: ACTIVE | Noted: 2017-08-09

## 2017-08-09 PROCEDURE — H0035 MH PARTIAL HOSP TX UNDER 24H: HCPCS

## 2017-08-09 ASSESSMENT — ANXIETY QUESTIONNAIRES
IF YOU CHECKED OFF ANY PROBLEMS ON THIS QUESTIONNAIRE, HOW DIFFICULT HAVE THESE PROBLEMS MADE IT FOR YOU TO DO YOUR WORK, TAKE CARE OF THINGS AT HOME, OR GET ALONG WITH OTHER PEOPLE: SOMEWHAT DIFFICULT
2. NOT BEING ABLE TO STOP OR CONTROL WORRYING: SEVERAL DAYS
1. FEELING NERVOUS, ANXIOUS, OR ON EDGE: SEVERAL DAYS
5. BEING SO RESTLESS THAT IT IS HARD TO SIT STILL: NOT AT ALL
GAD7 TOTAL SCORE: 8
6. BECOMING EASILY ANNOYED OR IRRITABLE: NEARLY EVERY DAY
3. WORRYING TOO MUCH ABOUT DIFFERENT THINGS: SEVERAL DAYS
7. FEELING AFRAID AS IF SOMETHING AWFUL MIGHT HAPPEN: SEVERAL DAYS

## 2017-08-09 ASSESSMENT — PATIENT HEALTH QUESTIONNAIRE - PHQ9
5. POOR APPETITE OR OVEREATING: SEVERAL DAYS
SUM OF ALL RESPONSES TO PHQ QUESTIONS 1-9: 21

## 2017-08-09 NOTE — PROGRESS NOTES
RN Review of Medical History / Physical Health Screen  Outpatient Behavioral Programs      CLIENT'S NAME: Sarah Cheema  MRN:   [id number]  :   1995 AGE:22 year old SEX: female    DATE OF DIAGNOSTIC ASSESSMENT: 17  DATE OF ADMISSION: 17   PROGRAM: [facility] ()      Following admission, the RN reviewed the following:    - Medical History / Physical Health Screen completed during the DA noted above.  - Immediate Health Concerns as noted on the Initial Individual Treatment Plan.    Client height and weight recorded by RN in epic: yes    BMI Review:  Was the patient informed of BMI? yes      Findings Above,  General nutrition education       RN Recommendations include: Continue to educate on nutrition and exercise. Educate on the importance to monitor regularly and if increase continues follow up with primary provider.        Da Templeton  2017

## 2017-08-09 NOTE — PROGRESS NOTES
Group Therapy Progress Notes     Area of Treatment Focus:  Symptom Management and Develop Socialization / Interpersonal Relationship Skills    Therapeutic Interventions/Treatment Strategies:  Support, Feedback, Structured Activity, Clarification and Education    Response to Treatment Strategies:  Accepted Feedback, Gave Feedback, Listened, Focused on Goals, Attentive, Accepted Support and Alert    Name of Group:  Mental health management, self awareness    Progress Note  Mental Health Management  Group was offered structured journaling exercise focusing on feeling identification and management.  Structured journaling was described as useful for providing containment of feeling.   Mandy was engaged and participated in activity.    Self Awareness:  Group members were guided through Laban's effort scale, identifying and exploring a range of movement qualities.  Purpose to gain awareness into one's self, particularly into preferred ways of moving and also to gain a vocabulary for describing experience.  Group members were also able to make correlations to preferred ways of moving and approach to life experiences.  Mandy was engaged.  She shared that she felt more comfortable moving with direction. She shared that this correlates to how she feels in life with feeling need for direction.        Is this a Weekly Review of the Progress on the Treatment Plan?  No

## 2017-08-09 NOTE — PROGRESS NOTES
Group Therapy Progress Notes     Area of Treatment Focus:  Symptom Management, Personal Safety, Community Resources/Discharge Planning, Abstinence/Relapse Prevention, Develop / Improve Independent Living Skills and Develop Socialization / Interpersonal Relationship Skills    Therapeutic Interventions/Treatment Strategies:  Support, Safety Assessments, Structured Activity and Education    Response to Treatment Strategies:  Accepted Feedback, Listened, Focused on Goals, Attentive and Accepted Support    Name of Group:  Psychotherapy Group and Aftercare Planning    Progress Note  Sarah began the partial program today. She reports depressed mood. Denies S/I or safety issues today. In Aftercare Planning, she began naming her needs on her coping cards which is a recovery tool. We discussed a lapse, relapse and collapse and what is helpful symptom management tools. Sarah will continue in the partial program.          Is this a Weekly Review of the Progress on the Treatment Plan?  No

## 2017-08-09 NOTE — PROGRESS NOTES
Group Therapy Progress Notes     Area of Treatment Focus:  Symptom Management    Therapeutic Interventions/Treatment Strategies:  Support, Feedback and Education    Response to Treatment Strategies:  Accepted Feedback, Gave Feedback, Listened and Focused on Goals    Name of Group:  Anxiety LAb    Progress Note  Discussed physiological aspects of anxiety (impact of adrenaline and role of cortisol) and the implications of this for managing anxiety: practicing intentional physical relaxation, reducing use of avoidance, systematic desensitization, importance of taking regular breaks when dealing with life's demands.              Is this a Weekly Review of the Progress on the Treatment Plan?  No

## 2017-08-10 ASSESSMENT — ANXIETY QUESTIONNAIRES: GAD7 TOTAL SCORE: 8
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
## 2017-08-11 ENCOUNTER — HOSPITAL ENCOUNTER (OUTPATIENT)
Dept: BEHAVIORAL HEALTH | Facility: CLINIC | Age: 22
End: 2017-08-11
Attending: PSYCHIATRY & NEUROLOGY
Payer: COMMERCIAL

## 2017-08-11 DIAGNOSIS — F33.2 SEVERE RECURRENT MAJOR DEPRESSION WITHOUT PSYCHOTIC FEATURES (H): Primary | ICD-10-CM

## 2017-08-11 PROCEDURE — H0035 MH PARTIAL HOSP TX UNDER 24H: HCPCS

## 2017-08-11 PROCEDURE — 90791 PSYCH DIAGNOSTIC EVALUATION: CPT

## 2017-08-11 RX ORDER — DULOXETIN HYDROCHLORIDE 30 MG/1
CAPSULE, DELAYED RELEASE ORAL
Qty: 60 CAPSULE | Refills: 0 | Status: SHIPPED | OUTPATIENT
Start: 2017-08-11

## 2017-08-11 NOTE — PROGRESS NOTES
[facility]   Dr. Blum's Psychiatric Progress Note  2017      Patient:  Sarah Cheema   Medical Record Number:  [id number]  :  1995          Interim History:   The patient's care was discussed with the treatment team and chart notes were reviewed.  Pt is doing ok today.  When she has panic attacks, she feels some dissociation.  She has more anxiety as the depression is better.     Psychiatric ROS:  Mood:   much better;  Still anxious;    Sleep:  Initial insomnia;  Got 5 hours last night  Appetite:  Slightly improved  Eating:  Not enough  Energy Level:LOW  Concentration/Memory Problems:  NO  Suicidal Thoughts:No  Homicidal Thoughts:No  Psychotic Symptoms: No  Medication Side Effects:Yes she wonders if tiredness is from meds;  No sex drive;    Medication Compliance:Yes   Physical Complaints:positive for allergies         Medications:     PAST PSYCH MEDS:  Zoloft, Wellbutrin (did not tolerate more than 150 mg daily due to anxiety), Lexapro and Lamictal (caused rash).      Current Outpatient Prescriptions   Medication Sig           Cymbalta Take 30 mg/d x 1 week then 60 mg/d     desogestrel-ethinyl estradiol (KARIVA) 0.15-0.02/0.01 MG () per tablet Take 1 tablet by mouth daily     Levocetirizine Dihydrochloride (XYZAL PO) Take 10 mg by mouth daily     fluticasone-salmeterol (ADVAIR) 250-50 MCG/DOSE diskus inhaler Inhale 1 puff into the lungs 2 times daily     BuPROPion HCl (WELLBUTRIN XL PO) Take 150 mg by mouth daily     Ipratropium-Albuterol (COMBIVENT RESPIMAT)  MCG/ACT inhaler Inhale 1 puff into the lungs as needed for shortness of breath / dyspnea or wheezing     fluticasone (VERAMYST) 27.5 MCG/SPRAY spray Spray 2 sprays into both nostrils 2 times daily     No current facility-administered medications for this encounter.              Allergies:     Allergies   Allergen Reactions     Fish Anaphylaxis     Can eat shell fish     Nuts Anaphylaxis     Can eat  renee            Psychiatric Examination:   There were no vitals taken for this visit.  Weight is 0 lbs 0 oz  There is no height or weight on file to calculate BMI.    Appearance:  awake, alert and adequately groomed  Attitude:  cooperative  Eye Contact:  good  Mood:  better  Affect:  mood congruent  Speech:  clear, coherent  Psychomotor Behavior:  no evidence of tardive dyskinesia, dystonia, or tics  Throught Process:  logical  Associations:  no loose associations  Thought Content:  no evidence of suicidal ideation or homicidal ideation and no evidence of psychotic thought  Insight:  good  Judgement:  intact  Oriented to:  time, person, and place  Attention Span and Concentration:  intact  Recent and Remote Memory:  intact  Gait:Normal    Risk/Potential for Dangerousness:  Multiple Active Diagnoses:HIGH  Self Care:MODERATE  Suicide:LOW  Assault:LOW  Self Injurious Behaviors: LOW  Inappropriate Sexual Behavior:LOW         Labs:   No results found for this or any previous visit (from the past 24 hour(s)).     Recent Results (from the past 1008 hour(s))   CBC with platelets differential    Collection Time: 07/24/17 12:09 AM   Result Value Ref Range    WBC 7.9 4.0 - 11.0 10e9/L    RBC Count 4.88 3.8 - 5.2 10e12/L    Hemoglobin 14.1 11.7 - 15.7 g/dL    Hematocrit 42.5 35.0 - 47.0 %    MCV 87 78 - 100 fl    MCH 28.9 26.5 - 33.0 pg    MCHC 33.2 31.5 - 36.5 g/dL    RDW 13.5 10.0 - 15.0 %    Platelet Count 250 150 - 450 10e9/L    Diff Method Automated Method     % Neutrophils 65.7 %    % Lymphocytes 24.0 %    % Monocytes 5.1 %    % Eosinophils 4.4 %    % Basophils 0.5 %    % Immature Granulocytes 0.3 %    Nucleated RBCs 0 0 /100    Absolute Neutrophil 5.2 1.6 - 8.3 10e9/L    Absolute Lymphocytes 1.9 0.8 - 5.3 10e9/L    Absolute Monocytes 0.4 0.0 - 1.3 10e9/L    Absolute Eosinophils 0.4 0.0 - 0.7 10e9/L    Absolute Basophils 0.0 0.0 - 0.2 10e9/L    Abs Immature Granulocytes 0.0 0 - 0.4 10e9/L    Absolute Nucleated RBC 0.0     INR    Collection Time: 07/24/17 12:09 AM   Result Value Ref Range    INR 1.06 0.86 - 1.14   Comprehensive metabolic panel    Collection Time: 07/24/17 12:09 AM   Result Value Ref Range    Sodium 141 133 - 144 mmol/L    Potassium 3.2 (L) 3.4 - 5.3 mmol/L    Chloride 108 94 - 109 mmol/L    Carbon Dioxide 24 20 - 32 mmol/L    Anion Gap 9 3 - 14 mmol/L    Glucose 110 (H) 70 - 99 mg/dL    Urea Nitrogen 7 7 - 30 mg/dL    Creatinine 0.75 0.52 - 1.04 mg/dL    GFR Estimate >90  Non  GFR Calc   >60 mL/min/1.7m2    GFR Estimate If Black >90   GFR Calc   >60 mL/min/1.7m2    Calcium 9.0 8.5 - 10.1 mg/dL    Bilirubin Total 0.1 (L) 0.2 - 1.3 mg/dL    Albumin 4.2 3.4 - 5.0 g/dL    Protein Total 7.9 6.8 - 8.8 g/dL    Alkaline Phosphatase 37 (L) 40 - 150 U/L    ALT 12 0 - 50 U/L    AST 8 0 - 45 U/L   Acetaminophen level    Collection Time: 07/24/17 12:09 AM   Result Value Ref Range    Acetaminophen Level <2  Therapeutic range: 10-20 mg/L   mg/L   Salicylate level    Collection Time: 07/24/17 12:09 AM   Result Value Ref Range    Salicylate Level 2 mg/dL   HCG qualitative    Collection Time: 07/24/17 12:09 AM   Result Value Ref Range    HCG Qualitative Serum Negative NEG   Creatinine POCT    Collection Time: 07/24/17 12:10 AM   Result Value Ref Range    Creatinine 0.7 0.52 - 1.04 mg/dL    GFR Estimate >90 >60 mL/min/1.7m2    GFR Estimate If Black >90 >60 mL/min/1.7m2   ISTAT gases lactate zenobia POCT    Collection Time: 07/24/17 12:10 AM   Result Value Ref Range    Ph Venous 7.40 7.32 - 7.43 pH    PCO2 Venous 35 (L) 40 - 50 mm Hg    PO2 Venous 40 25 - 47 mm Hg    Bicarbonate Venous 22 21 - 28 mmol/L    O2 Sat Venous 76 %    Lactic Acid 1.4 0.7 - 2.1 mmol/L   UA with Microscopic reflex to Culture    Collection Time: 07/24/17  1:07 AM   Result Value Ref Range    Color Urine Yellow     Appearance Urine Slightly Cloudy     Glucose Urine Negative NEG mg/dL    Bilirubin Urine Negative NEG    Ketones Urine  Negative NEG mg/dL    Specific Gravity Urine 1.021 1.003 - 1.035    Blood Urine Moderate (A) NEG    pH Urine 6.5 5.0 - 7.0 pH    Protein Albumin Urine 10 (A) NEG mg/dL    Urobilinogen mg/dL Normal 0.0 - 2.0 mg/dL    Nitrite Urine Negative NEG    Leukocyte Esterase Urine Moderate (A) NEG    Source Urine     WBC Urine 6 (H) 0 - 2 /HPF    RBC Urine 2 0 - 2 /HPF    Squamous Epithelial /HPF Urine 3 (H) 0 - 1 /HPF    Mucous Urine Present (A) NEG /LPF   Drug abuse screen 6 urine (chem dep)    Collection Time: 07/24/17  1:07 AM   Result Value Ref Range    Amphetamine Qual Urine  NEG     Negative   Cutoff for a negative amphetamine is 500 ng/mL or less.      Barbiturates Qual Urine  NEG     Negative   Cutoff for a negative barbiturate is 200 ng/mL or less.      Benzodiazepine Qual Urine (A) NEG     Positive   Cutoff for a positive benzodiazepine is greater than 200 ng/mL. This is an   unconfirmed screening result to be used for medical purposes only.      Cannabinoids Qual Urine (A) NEG     Positive   Cutoff for a positive cannabinoid is greater than 50 ng/mL. This is an   unconfirmed screening result to be used for medical purposes only.      Cocaine Qual Urine  NEG     Negative   Cutoff for a negative cocaine is 300 ng/mL or less.      Ethanol Qual Urine  NEG     Negative   Cutoff for a negative urine ethanol is 0.05 g/dL or less      Opiates Qualitative Urine  NEG     Negative   Cutoff for a negative opiate is 300 ng/mL or less.     Urine Culture Aerobic Bacterial    Collection Time: 07/24/17  1:27 AM   Result Value Ref Range    Specimen Description Midstream Urine     Special Requests Specimen received in preservative     Culture Micro       50,000 to 100,000 colonies/mL mixed urogenital alcides Susceptibility testing not   routinely done      Micro Report Status FINAL 07/25/2017    Lamotrigine Level    Collection Time: 07/25/17  8:22 AM   Result Value Ref Range    Lamotrigine Level 0.9 (L)    Bupropion level    Collection  Time: 07/25/17  8:22 AM   Result Value Ref Range    Bupropion Level 15 (L)     Hydroxybupropion Level 315 (L)    Escitalopram Serum or Plasma    Collection Time: 07/25/17  8:22 AM   Result Value Ref Range    Escitalopram 17.1     Desmethylcitalopram       <10.0  Unit: ng/mL  (Note)  Steady state plasma concentrations of escitalopram  in patients receiving recommended daily dosages:  up to 60 ng/mL.    Toxic range not established.    This test was developed and its performance characteristics  determined by [facility]. It has not been cleared or approved  by the Food and Drug Administration.  Analysis performed by [facility], [address] [place] [address]           Impression:   This is a 22 year old female continues PHP for mood stabilization.  Mood is improving.  Has sexual SE from Lexapro and is still anxious.          DIagnoses:     Axis I:     1.  Major depressive disorder, recurrent.   2.  Generalized anxiety disorder.   Axis II:  No diagnosis.    Axis III:  Asthma.            Plan:     Continue [facility] Program.   Wellbutrin helps energy but little else.   Decrease Lexapro from 20 to 10 mg/d x 1 week then stop.  Begin Cymbalta.   Expect stabilization and completion of Partial Hospital Program.   Follow up with Dr. Blum at [facility] and therapist, Jeanette Carroll at [facility].     Jesus Blum MD

## 2017-08-11 NOTE — PROGRESS NOTES
Adult Mental Health   Partial Hospitalization Program Progress Note     Client Initial Individualized Goals for Treatment:  stronger routine, second opinion about my medications, having a job I enjoy, strategies to lessen the depression.         See Initial Treatment suggestions for the client during the time between Diagnostic Assessment and completion of the Master Individualized Treatment Plan.    Treatment Goals:     Follow Safety Plan   Ask for more information, support and/or assistance as needed.  Follow up with providers/community supports as needed: Aleshia Chow-Psychotherapy in [facility]-Jeanette Moeller  Report increases or changes in symptoms to staff.  Report any personal safety concerns to staff.   Take medications as prescribed.  Report medication changes and/or side effects to staff.  Attend and participate in groups as scheduled or notify staff if unable to do so.  Report any use of substances to staff as this may impact your symptoms and/or personal safety.  Notify staff if you have any other issues that need to be addressed. This may include any current abuse / neglect / exploitation or other vulnerability.  Follow recommendations of your treatment team and discuss concerns if not in agreement.           Area of Treatment Focus:  Develop / Improve Independent Living Skills and Develop Socialization / Interpersonal Relationship Skills    Therapeutic Interventions/Treatment Strategies:  Support, Feedback, Structured Activity and Education    Response to Treatment Strategies:  Accepted Feedback, Gave Feedback, Listened, Attentive and Alert    Name of Group:  Trust     Description and Outcome:  Elizabeth Robison s  Anatomy of Trust  was watched. Worksheet guided patients through definition of trust and the components that make up the acronym BRAVING. Discussion surrounded components to BRAVING  and how the components affect trust with others and ourselves.   Assessment  Mood: Calm behavior, range in affect, stable  "mood throughout group  Thought Process: Linear and logical, productive and goal directed  Behavior: Cooperative, interacted within the group, listened and was respectful of others  Patient stated she struggles with reliability and boundaries in regards to trust.       Is this a Weekly Review of the Progress on the Treatment Plan?  No        Progressive Relaxation/ Guided Imagery  Description of Group:   Mindfulness group focused on the task of  progressive relaxation  \"breathing\" and  visualization . Introduction to mindfulness was done and various types were explained. Group members were able to participate in both exercises. Discussion was created at the end to process exercises.  Patient's were encouraged to discuss weekend plans, self care techniques they were apply this weekend and report their safety level.   Assessment  Mood: Calm behavior, range in affect, stable mood throughout group  Thought Process: Linear and logical, productive and goal directed  Behavior: Cooperative, interacted within the group, listened and was respectful of others  Progress   Patient was able to participate in group activity.  Patient enjoyed the visualization but had a difficult time with the progressive relaxation. \"It's hard for me to do some of these exercises. I think when I become aware of my body I feel uncomfortable and trapped.\"         "

## 2017-08-11 NOTE — PROGRESS NOTES
Group Therapy Progress Notes     Area of Treatment Focus:  Symptom Management, Personal Safety, Community Resources/Discharge Planning, Abstinence/Relapse Prevention, Develop / Improve Independent Living Skills and Develop Socialization / Interpersonal Relationship Skills    Therapeutic Interventions/Treatment Strategies:  Support, Safety Assessments, Structured Activity and Education    Response to Treatment Strategies:  Accepted Feedback, Listened, Focused on Goals, Attentive and Accepted Support    Name of Group:  Psychotherapy Group and Self Awareness    Progress Note  Sarah reports anxious mood. Denies S/I or safety issues. She continues to seek employment and had a job interview yesterday. She saw a position in [place] and hopes to apply. She reports feeling stuck here due to financial stress. Sarah wants to go back to [place] to be with her partner. She reports having a polyamorous lifestyle. We discussed cognitive reframing to help her feel more comfortable with her transition in [place]. She has a lease through May 2018. In Self Awareness, Sarah was able to participate in a mindful, creative experiential exercise by making a self collage. She was able to gain insight into self identity and values in group. Sarah will continue in the partial program.      Is this a Weekly Review of the Progress on the Treatment Plan?    No
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
## 2017-08-14 ENCOUNTER — HOSPITAL ENCOUNTER (OUTPATIENT)
Dept: BEHAVIORAL HEALTH | Facility: CLINIC | Age: 22
End: 2017-08-14
Attending: PSYCHIATRY & NEUROLOGY
Payer: COMMERCIAL

## 2017-08-14 PROCEDURE — H0035 MH PARTIAL HOSP TX UNDER 24H: HCPCS

## 2017-08-14 NOTE — PROGRESS NOTES
Group Therapy Progress Notes     Area of Treatment Focus:  Symptom Management, Personal Safety, Community Resources/Discharge Planning, Abstinence/Relapse Prevention, Develop / Improve Independent Living Skills and Develop Socialization / Interpersonal Relationship Skills    Therapeutic Interventions/Treatment Strategies:  Support, Safety Assessments, Structured Activity and Education    Response to Treatment Strategies:  Accepted Feedback, Listened, Focused on Goals, Attentive and Accepted Support    Name of Group:  Self Talk    Progress Note  Sarah was engaged in self talk and participated in mapping the impact of a change physiologically, emotionally, cognitively, behaviorally and spiritually.  We discussed how changes in lifestyle and thought patterns impact the sympathetic nervous system.          Is this a Weekly Review of the Progress on the Treatment Plan?  Yes.  Sarah identified treatment plan goals for her treatment plan. She reports creating a financial plan and will be moving back to [place] at the end of the month. She has providers in [place].     Are Treatment Plan Goals being addressed?  Yes, treatment goals revised      Are Treatment Plan Strategies to Address Goals Effective?  Yes, continue treatment strategies      Are there any current contracts in place?  No
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
## 2017-08-15 ENCOUNTER — HOSPITAL ENCOUNTER (OUTPATIENT)
Dept: BEHAVIORAL HEALTH | Facility: CLINIC | Age: 22
End: 2017-08-15
Attending: PSYCHIATRY & NEUROLOGY
Payer: COMMERCIAL

## 2017-08-15 PROCEDURE — H0035 MH PARTIAL HOSP TX UNDER 24H: HCPCS
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
## 2017-08-15 NOTE — PROGRESS NOTES
Adult Mental Health Partial Hospitalization Group Therapy Progress Note     Date: 8/14/17    Client Initial Individualized Goals for Treatment: stronger routine, second opinion about my medications, having a job I enjoy, strategies to lessen the depression.     Treatment Goals:  1) Client will use coping plan for safety, as needed  2)  In OT life skills Sarah will learn, develop, explore 2-3 skills/strategies that support an increase in self-awareness and self-care including self-regulation skills, values identification, and life balance.  3) De will process current stressors and their relationship to her symptoms. Identifying barriers to having a positive relationship with herself.  4) De will learn, explore, and problem solve around issues around sleep hygiene and learn about impact of medication on her sleep habits.   5) Sarah will make aftercare appointments with Dr. Blum at [facility] and therapist, Jeanette Carroll at [facility]. She will continue naming her needs of her coping cards which is a relapse prevention tool. SP      Area of Treatment Focus:  Symptom Management, Personal Safety, Community Resources/Discharge Planning, Develop / Improve Independent Living Skills and Develop Socialization / Interpersonal Relationship Skills    Therapeutic Interventions/Treatment Strategies:  Support, Redirection, Feedback, Limit/Boundaries, Safety Assessments, Structured Activity, Problem Solving, Clarification and Education    Response to Treatment Strategies:  Accepted Feedback, Gave Feedback, Listened, Focused on Goals, Attentive, Accepted Support and Alert    Name of Group:  OT life skills: goal integration.    Description and Outcome: De, attended and participated in a structured OT life skills group where intervention focuses on learning, practicing, and applying skills and strategies through psycho-education and structured experiential activities to mange mental health,  improve symptoms, and function in  valued roles, routines, and relationships. Today the focus is on goal integration. Group members provided with psycho-education around setting SMART goals/intentions and neuroscience behind making changes (including self-love/forgiveness, common humanity, and authenticity).  Emphasis on incorporating self-care, balance, and making intentions in small incremental steps. They are provided guidance during a structured activity to integrate their goals with taught concept and apply it  to a weekly schedule. De works with good focus today.  She is able to identify 3 functional areas and specify ways to integrate steps towards meeting her goals. De demonstrated understanding of session content by applying taught concepts to her goal integration work effectively. Affect even, asks good questions.     Is this a Weekly Review of the Progress on the Treatment Plan?  No.

## 2017-08-15 NOTE — PROGRESS NOTES
Group Therapy Progress Notes     Area of Treatment Focus:  Symptom Management and Develop / Improve Independent Living Skills    Therapeutic Interventions/Treatment Strategies:  Feedback and Education    Response to Treatment Strategies:  Accepted Feedback, Gave Feedback, Listened and Focused on Goals    Name of Group:  Mindfulness    Progress Note  Discussed impact of mindfulness on depression and anxiety and relationship to non-judgmental awareness of emotions. Practiced mindfulness, discussed importance of regular daily practice.          Is this a Weekly Review of the Progress on the Treatment Plan?  No

## 2017-08-15 NOTE — PROGRESS NOTES
Group Therapy Progress Notes     Area of Treatment Focus:  Symptom Management, Personal Safety, Community Resources/Discharge Planning, Abstinence/Relapse Prevention, Develop / Improve Independent Living Skills and Develop Socialization / Interpersonal Relationship Skills    Therapeutic Interventions/Treatment Strategies:  Support, Safety Assessments, Structured Activity and Education    Response to Treatment Strategies:  Accepted Feedback, Listened, Focused on Goals, Attentive and Accepted Support    Name of Group:  Self Awareness    Progress Note  Sarah did not attend psychotherapy group. She did attend the self awareness group. Sarah engaged in writing a letter to the Illness. It s a mindful, journaling tool for patients to build awareness into how they are communicating to themselves about the mood disorder.  She was able to process her insights and feelings with peers in group. She continues to identify what part of her self is the mood disorder vs. What is her self identity. Sarah will continue in the partial program.           Is this a Weekly Review of the Progress on the Treatment Plan?  No

## 2017-08-16 ENCOUNTER — HOSPITAL ENCOUNTER (OUTPATIENT)
Dept: BEHAVIORAL HEALTH | Facility: CLINIC | Age: 22
End: 2017-08-16
Attending: PSYCHIATRY & NEUROLOGY
Payer: COMMERCIAL

## 2017-08-16 PROCEDURE — H0035 MH PARTIAL HOSP TX UNDER 24H: HCPCS

## 2017-08-16 NOTE — PROGRESS NOTES
"Group Therapy Progress Notes     Area of Treatment Focus:  Symptom Management and Develop Socialization / Interpersonal Relationship Skills    Therapeutic Interventions/Treatment Strategies:  Support, Feedback, Structured Activity, Clarification and Education    Response to Treatment Strategies:  Accepted Feedback, Gave Feedback, Listened, Focused on Goals, Attentive, Accepted Support and Alert    Name of Group:  Mental health management    Progress Note  Group was provided with information/education on authenticity and perfectionism.  Authenticity was described as self validating in contrast to perfectionism which invalidates the self.  Self talk and cognitive distortions were presented as contributing to perfectionistic tendencies.  Group members were encouraged to identify forms of distortions and ways to challenge the self talk. Mandy was engaged.  She was insightful into patterns of distorted thinking and accepted feedback into ways to challenge the self talk.  She accepted feedback and encouragement to be \"open\" and intentional toward being authentic rather than \"force\" authenticity.          Is this a Weekly Review of the Progress on the Treatment Plan?  No     "
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
## 2017-08-16 NOTE — PROGRESS NOTES
Adult Mental Health Partial Hospitalization Group Therapy Progress Note     Date: 8/15/17    Client Initial Individualized Goals for Treatment: stronger routine, second opinion about my medications, having a job I enjoy, strategies to lessen the depression.     Treatment Goals:  1) Client will use coping plan for safety, as needed  2)  In OT life skills Sarah will learn, develop, explore 2-3 skills/strategies that support an increase in self-awareness and self-care including self-regulation skills, values identification, and life balance.  3) De will process current stressors and their relationship to her symptoms. Identifying barriers to having a positive relationship with herself.  4) De will learn, explore, and problem solve around issues around sleep hygiene and learn about impact of medication on her sleep habits.   5) Sarah will make aftercare appointments with Dr[facility] Blum at [facility] and therapist, Jeanette Carroll at [facility]. She will continue naming her needs of her coping cards which is a relapse prevention tool. SP      Area of Treatment Focus:  Symptom Management, Personal Safety, Community Resources/Discharge Planning, Develop / Improve Independent Living Skills and Develop Socialization / Interpersonal Relationship Skills    Therapeutic Interventions/Treatment Strategies:  Support, Redirection, Feedback, Limit/Boundaries, Safety Assessments, Structured Activity, Problem Solving, Clarification and Education    Response to Treatment Strategies:  Accepted Feedback, Gave Feedback, Listened, Focused on Goals, Attentive, Accepted Support and Alert    Name of Group:  OT life skills/mental health management.    Description and Outcome: De, attended and participated in a structured OT life skills group where intervention focuses on learning, practicing, and applying skills and strategies through psycho-education and structured experiential activities to mange mental health,  improve symptoms, and  "function in valued roles, routines, and relationships. Today the focus is values based prioritizing, breaking down projects, and solving problems through a structured process for improved engagement in meaningful/purposeful activities. Group members were asked to make a \"to do list\", and using that list they were guided through the experiential application of the process.  First they identified what is important and what is urgent, then discerning why the task may not be getting done: Is it important enough to them, is it a larger project and needs to be broken down, or is it a problem or a challenge where they have to make a tough decision or choice. De was appreciative of differentiating a \"to do\" from more complex tasks and endorses not doing things on her list because they are more complex. Threaded throughout the session was the ideas of focusing on their values, self-compassion and forgiveness. De demonstrated understanding of session content by applying taught concepts to current challenges in her life. Affect even.     Is this a Weekly Review of the Progress on the Treatment Plan?  No.              "

## 2017-08-17 ENCOUNTER — HOSPITAL ENCOUNTER (OUTPATIENT)
Dept: BEHAVIORAL HEALTH | Facility: CLINIC | Age: 22
End: 2017-08-17
Attending: PSYCHIATRY & NEUROLOGY
Payer: COMMERCIAL

## 2017-08-17 DIAGNOSIS — F33.2 SEVERE RECURRENT MAJOR DEPRESSION WITHOUT PSYCHOTIC FEATURES (H): Primary | ICD-10-CM

## 2017-08-17 PROCEDURE — H0035 MH PARTIAL HOSP TX UNDER 24H: HCPCS

## 2017-08-17 NOTE — PROGRESS NOTES
Adult Mental Health   Partial Hospitalization Program Progress Note     Client Initial Individualized Goals for Treatment:  stronger routine, second opinion about my medications, having a job I enjoy, strategies to lessen the depression.         See Initial Treatment suggestions for the client during the time between Diagnostic Assessment and completion of the Master Individualized Treatment Plan.    Treatment Goals:     Follow Safety Plan   Ask for more information, support and/or assistance as needed.  Follow up with providers/community supports as needed: Aleshia Chow-Psychotherapy in [facility]-Jeanette Moeller  Report increases or changes in symptoms to staff.  Report any personal safety concerns to staff.   Take medications as prescribed.  Report medication changes and/or side effects to staff.  Attend and participate in groups as scheduled or notify staff if unable to do so.  Report any use of substances to staff as this may impact your symptoms and/or personal safety.  Notify staff if you have any other issues that need to be addressed. This may include any current abuse / neglect / exploitation or other vulnerability.  Follow recommendations of your treatment team and discuss concerns if not in agreement.           Area of Treatment Focus:  Symptom Management, Personal Safety, Community Resources/Discharge Planning, Physical Health  and Other: Medications    Therapeutic Interventions/Treatment Strategies:  Feedback, Structured Activity and Education    Response to Treatment Strategies:  Accepted Feedback, Listened, Attentive and Alert    Name of Group:  Medication Education/Signs, Symptoms & Solutions    Description and Outcome:  Patient filled out medication worksheet. The worksheet asked patients to fill out current medications, side effects related to medications and tips to taking medications.  Discussion was encouraged to talk about tips for seeing a psychiatrist and nurse encouraged patients to write down  questions before attending an appointment.  Nurse met with each patient individually to update computer system with medications.   Worksheet were then given out and teaching was done related to signs and symptoms related to illness (depression, anxiety and bipolar). General progression of illness was sampled on a worksheet, patients were asked to fill out their own blank worksheet that identified their progression of symptoms. Patients were instructed what to do at each level of their illness.    Assessment  Mood: Calm behavior, range in affect, stable mood throughout group  Thought Process: Linear and logical, productive and goal directed  Behavior: Cooperative, interacted within the group, listened and was respectful of others  Concerned about her hives on her body, was encouraged to call primary provider to get them checked out. Patient followed through and was able to make an appointment for 3:30.       Is this a Weekly Review of the Progress on the Treatment Plan?  No

## 2017-08-17 NOTE — PROGRESS NOTES
"  Adult Mental Health Outpatient Group Therapy Progress Note     Client Initial Individualized Goals for Treatment: stronger routine, second opinion about my medications, having a job I enjoy, strategies to lessen the depression.      Treatment Goals:  1) Client will use coping plan for safety, as needed  2)  In OT life skills Sarah will learn, develop, explore 2-3 skills/strategies that support an increase in self-awareness and self-care including self-regulation skills, values identification, and life balance.  3) De will process current stressors and their relationship to her symptoms. Identifying barriers to having a positive relationship with herself.  4) De will learn, explore, and problem solve around issues around sleep hygiene and learn about impact of medication on her sleep habits.   5) Sarah will make aftercare appointments with Dr. Blum at [facility] and therapist, Jeanette Carroll at [facility]. She will continue naming her needs of her coping cards which is a relapse prevention tool. SP     Area of Treatment Focus:  Symptom Management and Develop Socialization / Interpersonal Relationship Skills    Therapeutic Interventions/Treatment Strategies:  Support, Feedback, Structured Activity, Clarification and Education    Response to Treatment Strategies:  Accepted Feedback, Gave Feedback, Listened, Focused on Goals, Attentive, Accepted Support and Alert    Name of Group:  Self awareness     Description and Outcome:  Group was guided through breathing exercise focusing on the practise of listening to and attending to oneself.  Group members were introduced to concept of \"yield\" and [place]-B[place]'s satisfaction movement cycle involving yield, push, reach, grasp and pull.  Group members identified their experience in yielding and further embodied concept with use of prop.  Mandy was engaged.  She shared that she has had difficulty with mindfulness and being embodied but found that she is better able to " stay focused on body using stretch and movement.    Is this a Weekly Review of the Progress on the Treatment Plan?  No

## 2017-08-18 ENCOUNTER — HOSPITAL ENCOUNTER (OUTPATIENT)
Dept: BEHAVIORAL HEALTH | Facility: CLINIC | Age: 22
End: 2017-08-18
Attending: PSYCHIATRY & NEUROLOGY
Payer: COMMERCIAL

## 2017-08-18 PROCEDURE — H0035 MH PARTIAL HOSP TX UNDER 24H: HCPCS

## 2017-08-18 RX ORDER — BUPROPION HYDROCHLORIDE 150 MG/1
150 TABLET ORAL EVERY MORNING
Qty: 30 TABLET | Refills: 0 | Status: SHIPPED | OUTPATIENT
Start: 2017-08-18

## 2017-08-18 RX ORDER — HYDROXYZINE PAMOATE 25 MG/1
CAPSULE ORAL
Qty: 60 CAPSULE | Refills: 0 | Status: SHIPPED | OUTPATIENT
Start: 2017-08-18

## 2017-08-18 NOTE — PROGRESS NOTES
Group Therapy Progress Notes     Area of Treatment Focus:  Symptom Management, Community Resources/Discharge Planning, Develop / Improve Independent Living Skills and Develop Socialization / Interpersonal Relationship Skills    Therapeutic Interventions/Treatment Strategies:  Support, Feedback, Education and Cognitive Behavioral Therapy    Response to Treatment Strategies:  Accepted Feedback, Gave Feedback, Listened and Focused on Goals    Name of Group:  Group psychotherapy, Network Development    Progress Note  Sarah participated in first two groups of the day. Says she is okay. Says she has gone from sleeping too much, to not sleeping much at all. She says she is aware of having more emotional volatility when she is tired. Says she saw her doctor yesterday: she has hives related to increase in anxiety anemia, and hormonal challenges due to PCOS. She plans to talk to psychiatrist about these issues today. Discussed how sh eis taking care of herself, she is doing more engagement with her support people, continuing to take small steps and give herself credit for getting done what she can. Says she is also trying to just accept how she feels moment to moment rather than catastrophize that she will always feel bad. Gave positive feedback for her use of coping strategies.    Discussed value of effective support in managing mental health issues, as well as common behavior of people who have depression to never ask for support. Completed exercise to begin identifying support needs and possible ways to work through barriers to asking for support.          Is this a Weekly Review of the Progress on the Treatment Plan?  No

## 2017-08-18 NOTE — PROGRESS NOTES
[facility]   Dr. Blum's Psychiatric Progress Note  2017      Patient:  Sarah Cheema   Medical Record Number:  [id number]  :  1995          Interim History:   The patient's care was discussed with the treatment team and chart notes were reviewed.  Better energy.      Psychiatric ROS:  Mood:   Better;  More positive outlook;  More anxious;    Sleep:  Initial and middle insomnia   Appetite:  slightly improved  Eating:  Making self eat  Energy Level: higher  Concentration/Memory Problems:  NO  Suicidal Thoughts:No  Homicidal Thoughts:No  Psychotic Symptoms: No  Medication Side Effects:    No sex drive;    Medication Compliance:Yes   Physical Complaints:  Hives on hands, arms, neck; primary MD thought this is from anxiety and put her on prednisone;  Pt has PCOS and started new birth control a few months ago.  She spotted heavily daily x 10 weeks.  Yesterday switched BCP's.  She's using benadry and Xyzal.  Rash started 1.5 weeks ago. Rash started before Cymbalta.           Medications:     PAST PSYCH MEDS:  Zoloft, Wellbutrin (did not tolerate more than 150 mg daily due to anxiety), Lexapro and Lamictal (caused rash).      Current Outpatient Prescriptions   Medication Sig     Vistaril Take 25 to 50mg tid prn anxiety or sleep     Cymbalta Take 30 mg/d x 1 week then 60 mg/d     desogestrel-ethinyl estradiol (KARIVA) 0.15-0.02/0.01 MG () per tablet Take 1 tablet by mouth daily     Levocetirizine Dihydrochloride (XYZAL PO) Take 10 mg by mouth daily     fluticasone-salmeterol (ADVAIR) 250-50 MCG/DOSE diskus inhaler Inhale 1 puff into the lungs 2 times daily     BuPROPion HCl (WELLBUTRIN XL PO) Take 150 mg by mouth daily     Ipratropium-Albuterol (COMBIVENT RESPIMAT)  MCG/ACT inhaler Inhale 1 puff into the lungs as needed for shortness of breath / dyspnea or wheezing     fluticasone (VERAMYST) 27.5 MCG/SPRAY spray Spray 2 sprays into both nostrils 2 times daily      No current facility-administered medications for this encounter.              Allergies:     Allergies   Allergen Reactions     Fish Anaphylaxis     Can eat shell fish     Nuts Anaphylaxis     Can eat almonds            Psychiatric Examination:   There were no vitals taken for this visit.  Weight is 0 lbs 0 oz  There is no height or weight on file to calculate BMI.    Appearance:  awake, alert and adequately groomed  Attitude:  cooperative  Eye Contact:  good  Mood:  better  Affect:  mood congruent  Speech:  clear, coherent  Psychomotor Behavior:  no evidence of tardive dyskinesia, dystonia, or tics  Throught Process:  logical  Associations:  no loose associations  Thought Content:  no evidence of suicidal ideation or homicidal ideation and no evidence of psychotic thought  Insight:  good  Judgement:  intact  Oriented to:  time, person, and place  Attention Span and Concentration:  intact  Recent and Remote Memory:  intact  Gait:Normal    Risk/Potential for Dangerousness:  Multiple Active Diagnoses:HIGH  Self Care:MODERATE  Suicide:LOW  Assault:LOW  Self Injurious Behaviors: LOW  Inappropriate Sexual Behavior:LOW         Labs:   No results found for this or any previous visit (from the past 24 hour(s)).     Recent Results (from the past 1008 hour(s))   CBC with platelets differential    Collection Time: 07/24/17 12:09 AM   Result Value Ref Range    WBC 7.9 4.0 - 11.0 10e9/L    RBC Count 4.88 3.8 - 5.2 10e12/L    Hemoglobin 14.1 11.7 - 15.7 g/dL    Hematocrit 42.5 35.0 - 47.0 %    MCV 87 78 - 100 fl    MCH 28.9 26.5 - 33.0 pg    MCHC 33.2 31.5 - 36.5 g/dL    RDW 13.5 10.0 - 15.0 %    Platelet Count 250 150 - 450 10e9/L    Diff Method Automated Method     % Neutrophils 65.7 %    % Lymphocytes 24.0 %    % Monocytes 5.1 %    % Eosinophils 4.4 %    % Basophils 0.5 %    % Immature Granulocytes 0.3 %    Nucleated RBCs 0 0 /100    Absolute Neutrophil 5.2 1.6 - 8.3 10e9/L    Absolute Lymphocytes 1.9 0.8 - 5.3 10e9/L     Absolute Monocytes 0.4 0.0 - 1.3 10e9/L    Absolute Eosinophils 0.4 0.0 - 0.7 10e9/L    Absolute Basophils 0.0 0.0 - 0.2 10e9/L    Abs Immature Granulocytes 0.0 0 - 0.4 10e9/L    Absolute Nucleated RBC 0.0    INR    Collection Time: 07/24/17 12:09 AM   Result Value Ref Range    INR 1.06 0.86 - 1.14   Comprehensive metabolic panel    Collection Time: 07/24/17 12:09 AM   Result Value Ref Range    Sodium 141 133 - 144 mmol/L    Potassium 3.2 (L) 3.4 - 5.3 mmol/L    Chloride 108 94 - 109 mmol/L    Carbon Dioxide 24 20 - 32 mmol/L    Anion Gap 9 3 - 14 mmol/L    Glucose 110 (H) 70 - 99 mg/dL    Urea Nitrogen 7 7 - 30 mg/dL    Creatinine 0.75 0.52 - 1.04 mg/dL    GFR Estimate >90  Non  GFR Calc   >60 mL/min/1.7m2    GFR Estimate If Black >90   GFR Calc   >60 mL/min/1.7m2    Calcium 9.0 8.5 - 10.1 mg/dL    Bilirubin Total 0.1 (L) 0.2 - 1.3 mg/dL    Albumin 4.2 3.4 - 5.0 g/dL    Protein Total 7.9 6.8 - 8.8 g/dL    Alkaline Phosphatase 37 (L) 40 - 150 U/L    ALT 12 0 - 50 U/L    AST 8 0 - 45 U/L   Acetaminophen level    Collection Time: 07/24/17 12:09 AM   Result Value Ref Range    Acetaminophen Level <2  Therapeutic range: 10-20 mg/L   mg/L   Salicylate level    Collection Time: 07/24/17 12:09 AM   Result Value Ref Range    Salicylate Level 2 mg/dL   HCG qualitative    Collection Time: 07/24/17 12:09 AM   Result Value Ref Range    HCG Qualitative Serum Negative NEG   Creatinine POCT    Collection Time: 07/24/17 12:10 AM   Result Value Ref Range    Creatinine 0.7 0.52 - 1.04 mg/dL    GFR Estimate >90 >60 mL/min/1.7m2    GFR Estimate If Black >90 >60 mL/min/1.7m2   ISTAT gases lactate zenobia POCT    Collection Time: 07/24/17 12:10 AM   Result Value Ref Range    Ph Venous 7.40 7.32 - 7.43 pH    PCO2 Venous 35 (L) 40 - 50 mm Hg    PO2 Venous 40 25 - 47 mm Hg    Bicarbonate Venous 22 21 - 28 mmol/L    O2 Sat Venous 76 %    Lactic Acid 1.4 0.7 - 2.1 mmol/L   UA with Microscopic reflex to Culture     Collection Time: 07/24/17  1:07 AM   Result Value Ref Range    Color Urine Yellow     Appearance Urine Slightly Cloudy     Glucose Urine Negative NEG mg/dL    Bilirubin Urine Negative NEG    Ketones Urine Negative NEG mg/dL    Specific Gravity Urine 1.021 1.003 - 1.035    Blood Urine Moderate (A) NEG    pH Urine 6.5 5.0 - 7.0 pH    Protein Albumin Urine 10 (A) NEG mg/dL    Urobilinogen mg/dL Normal 0.0 - 2.0 mg/dL    Nitrite Urine Negative NEG    Leukocyte Esterase Urine Moderate (A) NEG    Source Urine     WBC Urine 6 (H) 0 - 2 /HPF    RBC Urine 2 0 - 2 /HPF    Squamous Epithelial /HPF Urine 3 (H) 0 - 1 /HPF    Mucous Urine Present (A) NEG /LPF   Drug abuse screen 6 urine (chem dep)    Collection Time: 07/24/17  1:07 AM   Result Value Ref Range    Amphetamine Qual Urine  NEG     Negative   Cutoff for a negative amphetamine is 500 ng/mL or less.      Barbiturates Qual Urine  NEG     Negative   Cutoff for a negative barbiturate is 200 ng/mL or less.      Benzodiazepine Qual Urine (A) NEG     Positive   Cutoff for a positive benzodiazepine is greater than 200 ng/mL. This is an   unconfirmed screening result to be used for medical purposes only.      Cannabinoids Qual Urine (A) NEG     Positive   Cutoff for a positive cannabinoid is greater than 50 ng/mL. This is an   unconfirmed screening result to be used for medical purposes only.      Cocaine Qual Urine  NEG     Negative   Cutoff for a negative cocaine is 300 ng/mL or less.      Ethanol Qual Urine  NEG     Negative   Cutoff for a negative urine ethanol is 0.05 g/dL or less      Opiates Qualitative Urine  NEG     Negative   Cutoff for a negative opiate is 300 ng/mL or less.     Urine Culture Aerobic Bacterial    Collection Time: 07/24/17  1:27 AM   Result Value Ref Range    Specimen Description Midstream Urine     Special Requests Specimen received in preservative     Culture Micro       50,000 to 100,000 colonies/mL mixed urogenital alcides Susceptibility testing  not   routinely done      Micro Report Status FINAL 07/25/2017    Lamotrigine Level    Collection Time: 07/25/17  8:22 AM   Result Value Ref Range    Lamotrigine Level 0.9 (L)    Bupropion level    Collection Time: 07/25/17  8:22 AM   Result Value Ref Range    Bupropion Level 15 (L)     Hydroxybupropion Level 315 (L)    Escitalopram Serum or Plasma    Collection Time: 07/25/17  8:22 AM   Result Value Ref Range    Escitalopram 17.1     Desmethylcitalopram       <10.0  Unit: ng/mL  (Note)  Steady state plasma concentrations of escitalopram  in patients receiving recommended daily dosages:  up to 60 ng/mL.    Toxic range not established.    This test was developed and its performance characteristics  determined by [employer]. It has not been cleared or approved  by the Food and Drug Administration.  Analysis performed by [employer] [place] [address]           Impression:   This is a 22 year old female continues PHP for mood stabilization.  Mood is improving.  Has sexual SE from Lexapro and is still anxious.          DIagnoses:     Axis I:     1.  Major depressive disorder, recurrent.   2.  Generalized anxiety disorder.   Axis II:  No diagnosis.    Axis III:  Asthma.            Plan:     Continue [facility] Program.   Wellbutrin helps energy but little else.   Add Vistaril for anxiety and sleep and hives.    Decrease Lexapro from 20 to 10 mg/d x 1 week then stop.  Begin Cymbalta.   Expect stabilization and completion of Partial Hospital Program.   Follow up with Dr. Blum at [facility] and therapist, Jeanette Carroll at [facility].     Jesus Blum MD

## 2017-08-18 NOTE — PROGRESS NOTES
Adult Mental Health   Partial Hospitalization Program Progress Note     Client Initial Individualized Goals for Treatment:  stronger routine, second opinion about my medications, having a job I enjoy, strategies to lessen the depression.         See Initial Treatment suggestions for the client during the time between Diagnostic Assessment and completion of the Master Individualized Treatment Plan.    Treatment Goals:     Follow Safety Plan   Ask for more information, support and/or assistance as needed.  Follow up with providers/community supports as needed: Aleshia Chow-Psychotherapy in [facility]-Jeanette Moeller  Report increases or changes in symptoms to staff.  Report any personal safety concerns to staff.   Take medications as prescribed.  Report medication changes and/or side effects to staff.  Attend and participate in groups as scheduled or notify staff if unable to do so.  Report any use of substances to staff as this may impact your symptoms and/or personal safety.  Notify staff if you have any other issues that need to be addressed. This may include any current abuse / neglect / exploitation or other vulnerability.  Follow recommendations of your treatment team and discuss concerns if not in agreement.           Area of Treatment Focus:  Symptom Management, Personal Safety, Develop / Improve Independent Living Skills and Physical Health     Therapeutic Interventions/Treatment Strategies:  Support, Feedback, Problem Solving, Education and Cognitive Behavioral Therapy    Response to Treatment Strategies:  Accepted Feedback, Listened, Attentive and Alert    Name of Group: Stress Managment    Description and Outcome:    We then discussed the importance of stress and why we are created to experience stress. We recognize why stress has become a problem and talked about ways to build a firm foundation so that we may take on stress that comes our way.We discussed strategies to reduce stress such as exercise, creative  hobbies and sleep. We played game of Family Feud to learn about stress management.   Assessment  Mood: Calm behavior, range in affect, stable mood throughout group  Thought Process: Linear and logical, productive and goal directed  Behavior: Cooperative, interacted within the group, listened and was respectful of others  Patient identified skills and strategies to manage stress.     Is this a Weekly Review of the Progress on the Treatment Plan?  No             Area of Treatment Focus:  Symptom Management and Develop / Improve Independent Living Skills    Therapeutic Interventions/Treatment Strategies:  Support, Redirection, Feedback, Structured Activity and Education    Response to Treatment Strategies:  Accepted Feedback, Listened, Attentive and Alert    Name of Group:  Visual imagery    Description and Outcome:  Group is designed to explore mindfulness activities. This particular group looks at Visual  Imagery. Patients are given a task to draw and color a scene that brings them appeasement. Patients then complete a worksheet that walks though the 5 senses they see, taste, hear, touch and smell. Discussion was encouraged to discover more about mindfulness and strategies that remove symptoms of anxiety and depression.  Assessment  Mood: Calm behavior, range in affect, stable mood throughout group  Thought Process: Linear and logical, productive and goal directed  Behavior: Cooperative, interacted within the group, listened and was respectful of others  Stated she enjoyed sensory class and was inspired to get cinnamon scents. She recognized she enjoys fall and wants to use that as motive for healing. Patient wanted to work on self care this weekend and was going to work on prioritizing needs before her upcoming move.       Is this a Weekly Review of the Progress on the Treatment Plan?  No

## 2017-08-21 ENCOUNTER — HOSPITAL ENCOUNTER (OUTPATIENT)
Dept: BEHAVIORAL HEALTH | Facility: CLINIC | Age: 22
End: 2017-08-21
Attending: PSYCHIATRY & NEUROLOGY
Payer: COMMERCIAL

## 2017-08-21 NOTE — PROGRESS NOTES
Adult Mental Health Partial Program  Progress Note     Client Initial Individualized Goals for Treatment: stronger routine, second opinion about my medications, having a job I enjoy, strategies to lessen the depression.      Treatment Goals:  1) Client will use coping plan for safety, as needed  2)  In OT life skills Sarah will learn, develop, explore 2-3 skills/strategies that support an increase in self-awareness and self-care including self-regulation skills, values identification, and life balance.  3) De will process current stressors and their relationship to her symptoms. Identifying barriers to having a positive relationship with herself.  4) De will learn, explore, and problem solve around issues around sleep hygiene and learn about impact of medication on her sleep habits.   5) Sarah will make aftercare appointments with Dr. Blum at [facility] and therapist, Jeanette Carroll at [facility]. She will continue naming her needs of her coping cards which is a relapse prevention tool. SP     Area of Treatment Focus:  Symptom Management, Personal Safety and Develop / Improve Independent Living Skills    Therapeutic Interventions/Treatment Strategies:  Support, Feedback, Safety Assessments and Structured Activity and Sensory Modalities and Weighted Materials    Response to Treatment Strategies:  Accepted Feedback, Gave Feedback, Listened, Focused on Goals, Accepted Support and Alert    Name of Group:  OT Life Skills     Description and Outcome:  Sarah actively participated in a psycho-educational group focused on learning about our sensory system and how sensory input can be helpful in managing symptoms and stress.  Sarah was able to identify helpful calming and alerting input to help with managing her symptoms.  Sarah was able to recognize self regulation skills she uses.  She asked several relevant questions during the group. Client verbalized understanding of session content by identifying helpful  sensory input she can use to help mange her symptoms and stressors better.     Is this a Weekly Review of the Progress on the Treatment Plan?  No

## 2017-08-23 ENCOUNTER — HOSPITAL ENCOUNTER (OUTPATIENT)
Dept: BEHAVIORAL HEALTH | Facility: CLINIC | Age: 22
End: 2017-08-23
Attending: PSYCHIATRY & NEUROLOGY
Payer: COMMERCIAL

## 2017-08-23 DIAGNOSIS — F33.2 SEVERE RECURRENT MAJOR DEPRESSION WITHOUT PSYCHOTIC FEATURES (H): Primary | ICD-10-CM

## 2017-08-23 PROCEDURE — H0035 MH PARTIAL HOSP TX UNDER 24H: HCPCS

## 2017-08-23 RX ORDER — ESZOPICLONE 3 MG/1
3 TABLET, FILM COATED ORAL
Qty: 30 TABLET | Refills: 0 | Status: SHIPPED | OUTPATIENT
Start: 2017-08-23

## 2017-08-23 NOTE — PROGRESS NOTES
Group Therapy Progress Notes     Area of Treatment Focus:  Symptom Management and Develop Socialization / Interpersonal Relationship Skills    Therapeutic Interventions/Treatment Strategies:  Support, Feedback, Structured Activity, Clarification and Education    Response to Treatment Strategies:  Accepted Feedback, Gave Feedback, Listened, Focused on Goals, Attentive, Accepted Support and Alert    Name of Group:  Mental health management    Progress Note  Education provided regarding self compassion.  Self compassion was presented as an approach toward self that involves kindness and gentleness, in contrast to concept of self esteem which is often based on comparisons and contingent on circumstances.  Self compassion also serves to connect with our common humanity. Mandy identified struggled with self compassion.  She shared that she felt it would be useful to practise when feeling positive about self so that it would be easier when she struggles.          Is this a Weekly Review of the Progress on the Treatment Plan?  No

## 2017-08-23 NOTE — PROGRESS NOTES
Adult Mental Health   Partial Hospitalization Program Progress Note     Client Initial Individualized Goals for Treatment:  stronger routine, second opinion about my medications, having a job I enjoy, strategies to lessen the depression.         See Initial Treatment suggestions for the client during the time between Diagnostic Assessment and completion of the Master Individualized Treatment Plan.    Treatment Goals:     Follow Safety Plan   Ask for more information, support and/or assistance as needed.  Follow up with providers/community supports as needed: Aleshia Chow-Psychotherapy in [facility]-Jeanette Moeller  Report increases or changes in symptoms to staff.  Report any personal safety concerns to staff.   Take medications as prescribed.  Report medication changes and/or side effects to staff.  Attend and participate in groups as scheduled or notify staff if unable to do so.  Report any use of substances to staff as this may impact your symptoms and/or personal safety.  Notify staff if you have any other issues that need to be addressed. This may include any current abuse / neglect / exploitation or other vulnerability.  Follow recommendations of your treatment team and discuss concerns if not in agreement.           Area of Treatment Focus:  Symptom Management, Develop / Improve Independent Living Skills and Physical Health     Therapeutic Interventions/Treatment Strategies:  Support, Feedback, Structured Activity and Education    Response to Treatment Strategies:  Accepted Feedback, Listened, Attentive and Alert    Name of Group:  Sleep     Description and Outcome:   Case Studies were presented to patients. These case studies described characters who struggled with sleep. Patients discussed signs and symptoms of sleep deprivation present within each case study. Patients made suggestions for improved sleep within each story.  A hand-out on Sleep Hygiene was given and patients were encouraged to adapt healthy changes  to their routine .   Assessment  Appearance/ Mood: Calm behavior, range in affect, stable mood throughout group. Affect was consistent with mood.  Appropriate dress, well-groomed and was cooperative within group.   Thought Process: Linear and logical, productive and goal directed. Contributed to group conversation.   Behavior: Cooperative, interacted within the group, listened and was respectful to others  Patient stated she wanted to work on establishing a bed time. Patient's sleep has been off and on lately.       Is this a Weekly Review of the Progress on the Treatment Plan?  No

## 2017-08-23 NOTE — PROGRESS NOTES
[facility]   Dr. Blum's Psychiatric Progress Note  2017      Patient:  Sarah Cheema   Medical Record Number:  [id number]  :  1995          Interim History:   The patient's care was discussed with the treatment team and chart notes were reviewed.  Sleeps well with Vistaril but makes it hard for her to get out of bed.  She's been taking 25mg.         Psychiatric ROS:  Mood:   Getting better;  Less depressed and less anxious  Sleep:  Much better with Vistaril but sleeps too much  Appetite:  better  Eating:  better  Energy Level: low on Vistaril  Concentration/Memory Problems:  No  Suicidal Thoughts:No  Homicidal Thoughts:No  Psychotic Symptoms: No  Medication Side Effects:    Sedation on Vistaril;    Medication Compliance:Yes   Physical Complaints:  hives are mostly gone         Medications:     PAST PSYCH MEDS:  Zoloft, Wellbutrin (did not tolerate more than 150 mg daily due to anxiety), Lexapro and Lamictal (caused rash).      Current Outpatient Prescriptions   Medication Sig     Vistaril Take 25 to 50mg tid prn anxiety or sleep    Lunesta Take 3mg po qHS prn     Cymbalta Take 60 mg/d     desogestrel-ethinyl estradiol (KARIVA) 0.15-0.02/0.01 MG () per tablet Take 1 tablet by mouth daily     Levocetirizine Dihydrochloride (XYZAL PO) Take 10 mg by mouth daily     fluticasone-salmeterol (ADVAIR) 250-50 MCG/DOSE diskus inhaler Inhale 1 puff into the lungs 2 times daily     BuPROPion HCl (WELLBUTRIN XL PO) Take 150 mg by mouth daily     Ipratropium-Albuterol (COMBIVENT RESPIMAT)  MCG/ACT inhaler Inhale 1 puff into the lungs as needed for shortness of breath / dyspnea or wheezing     fluticasone (VERAMYST) 27.5 MCG/SPRAY spray Spray 2 sprays into both nostrils 2 times daily     No current facility-administered medications for this encounter.              Allergies:     Allergies   Allergen Reactions     Fish Anaphylaxis     Can eat shell fish     Nuts  Anaphylaxis     Can eat almonds            Psychiatric Examination:   There were no vitals taken for this visit.  Weight is 0 lbs 0 oz  There is no height or weight on file to calculate BMI.    Appearance:  awake, alert and adequately groomed  Attitude:  cooperative  Eye Contact:  good  Mood:  better  Affect:  mood congruent  Speech:  clear, coherent  Psychomotor Behavior:  no evidence of tardive dyskinesia, dystonia, or tics  Throught Process:  logical  Associations:  no loose associations  Thought Content:  no evidence of suicidal ideation or homicidal ideation and no evidence of psychotic thought  Insight:  good  Judgement:  intact  Oriented to:  time, person, and place  Attention Span and Concentration:  intact  Recent and Remote Memory:  intact  Gait:Normal    Risk/Potential for Dangerousness:  Multiple Active Diagnoses:HIGH  Self Care:MODERATE  Suicide:LOW  Assault:LOW  Self Injurious Behaviors: LOW  Inappropriate Sexual Behavior:LOW         Labs:   No results found for this or any previous visit (from the past 24 hour(s)).     Recent Results (from the past 1008 hour(s))   CBC with platelets differential    Collection Time: 07/24/17 12:09 AM   Result Value Ref Range    WBC 7.9 4.0 - 11.0 10e9/L    RBC Count 4.88 3.8 - 5.2 10e12/L    Hemoglobin 14.1 11.7 - 15.7 g/dL    Hematocrit 42.5 35.0 - 47.0 %    MCV 87 78 - 100 fl    MCH 28.9 26.5 - 33.0 pg    MCHC 33.2 31.5 - 36.5 g/dL    RDW 13.5 10.0 - 15.0 %    Platelet Count 250 150 - 450 10e9/L    Diff Method Automated Method     % Neutrophils 65.7 %    % Lymphocytes 24.0 %    % Monocytes 5.1 %    % Eosinophils 4.4 %    % Basophils 0.5 %    % Immature Granulocytes 0.3 %    Nucleated RBCs 0 0 /100    Absolute Neutrophil 5.2 1.6 - 8.3 10e9/L    Absolute Lymphocytes 1.9 0.8 - 5.3 10e9/L    Absolute Monocytes 0.4 0.0 - 1.3 10e9/L    Absolute Eosinophils 0.4 0.0 - 0.7 10e9/L    Absolute Basophils 0.0 0.0 - 0.2 10e9/L    Abs Immature Granulocytes 0.0 0 - 0.4 10e9/L     Absolute Nucleated RBC 0.0    INR    Collection Time: 07/24/17 12:09 AM   Result Value Ref Range    INR 1.06 0.86 - 1.14   Comprehensive metabolic panel    Collection Time: 07/24/17 12:09 AM   Result Value Ref Range    Sodium 141 133 - 144 mmol/L    Potassium 3.2 (L) 3.4 - 5.3 mmol/L    Chloride 108 94 - 109 mmol/L    Carbon Dioxide 24 20 - 32 mmol/L    Anion Gap 9 3 - 14 mmol/L    Glucose 110 (H) 70 - 99 mg/dL    Urea Nitrogen 7 7 - 30 mg/dL    Creatinine 0.75 0.52 - 1.04 mg/dL    GFR Estimate >90  Non  GFR Calc   >60 mL/min/1.7m2    GFR Estimate If Black >90   GFR Calc   >60 mL/min/1.7m2    Calcium 9.0 8.5 - 10.1 mg/dL    Bilirubin Total 0.1 (L) 0.2 - 1.3 mg/dL    Albumin 4.2 3.4 - 5.0 g/dL    Protein Total 7.9 6.8 - 8.8 g/dL    Alkaline Phosphatase 37 (L) 40 - 150 U/L    ALT 12 0 - 50 U/L    AST 8 0 - 45 U/L   Acetaminophen level    Collection Time: 07/24/17 12:09 AM   Result Value Ref Range    Acetaminophen Level <2  Therapeutic range: 10-20 mg/L   mg/L   Salicylate level    Collection Time: 07/24/17 12:09 AM   Result Value Ref Range    Salicylate Level 2 mg/dL   HCG qualitative    Collection Time: 07/24/17 12:09 AM   Result Value Ref Range    HCG Qualitative Serum Negative NEG   Creatinine POCT    Collection Time: 07/24/17 12:10 AM   Result Value Ref Range    Creatinine 0.7 0.52 - 1.04 mg/dL    GFR Estimate >90 >60 mL/min/1.7m2    GFR Estimate If Black >90 >60 mL/min/1.7m2   ISTAT gases lactate zenobia POCT    Collection Time: 07/24/17 12:10 AM   Result Value Ref Range    Ph Venous 7.40 7.32 - 7.43 pH    PCO2 Venous 35 (L) 40 - 50 mm Hg    PO2 Venous 40 25 - 47 mm Hg    Bicarbonate Venous 22 21 - 28 mmol/L    O2 Sat Venous 76 %    Lactic Acid 1.4 0.7 - 2.1 mmol/L   UA with Microscopic reflex to Culture    Collection Time: 07/24/17  1:07 AM   Result Value Ref Range    Color Urine Yellow     Appearance Urine Slightly Cloudy     Glucose Urine Negative NEG mg/dL    Bilirubin Urine  Negative NEG    Ketones Urine Negative NEG mg/dL    Specific Gravity Urine 1.021 1.003 - 1.035    Blood Urine Moderate (A) NEG    pH Urine 6.5 5.0 - 7.0 pH    Protein Albumin Urine 10 (A) NEG mg/dL    Urobilinogen mg/dL Normal 0.0 - 2.0 mg/dL    Nitrite Urine Negative NEG    Leukocyte Esterase Urine Moderate (A) NEG    Source Urine     WBC Urine 6 (H) 0 - 2 /HPF    RBC Urine 2 0 - 2 /HPF    Squamous Epithelial /HPF Urine 3 (H) 0 - 1 /HPF    Mucous Urine Present (A) NEG /LPF   Drug abuse screen 6 urine (chem dep)    Collection Time: 07/24/17  1:07 AM   Result Value Ref Range    Amphetamine Qual Urine  NEG     Negative   Cutoff for a negative amphetamine is 500 ng/mL or less.      Barbiturates Qual Urine  NEG     Negative   Cutoff for a negative barbiturate is 200 ng/mL or less.      Benzodiazepine Qual Urine (A) NEG     Positive   Cutoff for a positive benzodiazepine is greater than 200 ng/mL. This is an   unconfirmed screening result to be used for medical purposes only.      Cannabinoids Qual Urine (A) NEG     Positive   Cutoff for a positive cannabinoid is greater than 50 ng/mL. This is an   unconfirmed screening result to be used for medical purposes only.      Cocaine Qual Urine  NEG     Negative   Cutoff for a negative cocaine is 300 ng/mL or less.      Ethanol Qual Urine  NEG     Negative   Cutoff for a negative urine ethanol is 0.05 g/dL or less      Opiates Qualitative Urine  NEG     Negative   Cutoff for a negative opiate is 300 ng/mL or less.     Urine Culture Aerobic Bacterial    Collection Time: 07/24/17  1:27 AM   Result Value Ref Range    Specimen Description Midstream Urine     Special Requests Specimen received in preservative     Culture Micro       50,000 to 100,000 colonies/mL mixed urogenital alcides Susceptibility testing not   routinely done      Micro Report Status FINAL 07/25/2017    Lamotrigine Level    Collection Time: 07/25/17  8:22 AM   Result Value Ref Range    Lamotrigine Level 0.9 (L)     Bupropion level    Collection Time: 07/25/17  8:22 AM   Result Value Ref Range    Bupropion Level 15 (L)     Hydroxybupropion Level 315 (L)    Escitalopram Serum or Plasma    Collection Time: 07/25/17  8:22 AM   Result Value Ref Range    Escitalopram 17.1     Desmethylcitalopram       <10.0  Unit: ng/mL  (Note)  Steady state plasma concentrations of escitalopram  in patients receiving recommended daily dosages:  up to 60 ng/mL.    Toxic range not established.    This test was developed and its performance characteristics  determined by [employer]. It has not been cleared or approved  by the Food and Drug Administration.  Analysis performed by [facility] [place] [address]           Impression:   This is a 22 year old female continues PHP for mood stabilization.  Mood is better.  Has insomnia now.  Vistaril gives her a hangover.           DIagnoses:     Axis I:     1.  Major depressive disorder, recurrent.   2.  Generalized anxiety disorder.   Axis II:  No diagnosis.    Axis III:  Asthma.            Plan:     Continue [facility] Program.   Wellbutrin helps energy but little else.   Added Vistaril for anxiety and and hives.    Added Lunesta for sleep.   Tapered off Lexapro  Started and titrated Cymbalta.   Expect stabilization and completion of Partial Hospital Program.   Pt will see her psychiatrist in [place].  Moves back there this month.      Jesus Blum MD

## 2017-08-23 NOTE — PROGRESS NOTES
Group Therapy Progress Notes     Area of Treatment Focus:  Symptom Management, Personal Safety, Community Resources/Discharge Planning, Abstinence/Relapse Prevention, Develop / Improve Independent Living Skills and Develop Socialization / Interpersonal Relationship Skills    Therapeutic Interventions/Treatment Strategies:  Support, Safety Assessments, Structured Activity and Education    Response to Treatment Strategies:  Accepted Feedback, Listened, Focused on Goals, Attentive and Accepted Support    Name of Group:  Psychotherapy Group and Aftercare Planning    Progress Note  Sarah reports anxious mood. Denies S/I or safety issues. She continues to plan and take the steps to tie up loose ends for her upcoming move next week to [place]. She had a male friend from [place] fly in yesterday to help her with these tasks. She will have 2 showings for her apartment today. She talked with her mom yesterday and updated her on life happenings. The mom then disclosed to her grandma that she needed money for the move.  Her grandmother gave her the money.  Sarah has meaningful support.  In Aftercare Planning, Sarah continued naming her aftercare needs on her coping cards which is a relapse prevention tool. She has  a therapist in [place]. Sarah continues to use symptom management skills. She will have tomorrow be her tentative last day.          Is this a Weekly Review of the Progress on the Treatment Plan?    No

## 2017-08-24 ENCOUNTER — HOSPITAL ENCOUNTER (OUTPATIENT)
Dept: BEHAVIORAL HEALTH | Facility: CLINIC | Age: 22
End: 2017-08-24
Attending: PSYCHIATRY & NEUROLOGY
Payer: COMMERCIAL

## 2017-08-24 PROCEDURE — H0035 MH PARTIAL HOSP TX UNDER 24H: HCPCS

## 2017-08-24 NOTE — PROGRESS NOTES
Group Therapy Progress Notes     Area of Treatment Focus:  Symptom Management, Personal Safety, Community Resources/Discharge Planning, Abstinence/Relapse Prevention, Develop / Improve Independent Living Skills and Develop Socialization / Interpersonal Relationship Skills    Therapeutic Interventions/Treatment Strategies:  Support, Safety Assessments, Structured Activity and Education    Response to Treatment Strategies:  Accepted Feedback, Listened, Focused on Goals, Attentive and Accepted Support    Name of Group:  Psychotherapy Group and Interpersonal Relationship Skills    Progress Note  Sarah had her last day in the partial program. She reports mild anxiety related to her preparation to move to [place]. She did get a renter for her apartment.  She is finishing last minute tasks with the support of her friend Ja. When he leaves to go back to [place] her partner will be flying in from [place] to drive back with her. She is aware of coping skills for symptom management. In Interpersonal Relationships, Sarah was also able to make a map of her relationships and identify interpersonal relationship dynamics.  Sarah was able to process with a peer in group. She has a relapse prevention plan including support people in [place]. Sarah will follow up with Shaniqua Lewis MD for both medication management and psychotherapy. Provider address and number: [address] [address] and [phone].  Her address will be [address] [place] [address]. D/C pt from the partial program. Writer update d/c information with Saira weiss [facility]. [phone]. Pt attended 10 days of partial from 8/9-8/24/17.           Is this a Weekly Review of the Progress on the Treatment Plan?  Yes. Sarah has met her treatment plan goals. She continues to use symptom management, sensory regulation tools, task management skills, mindfulness and communicating her needs. She is more aware of her pattern of mood  symptoms and has a recovery plan. She continues to practice self care, self compassion and validating self talk. Pt denies safety issues upon discharge. She is finding the coping cards a helpful tool.

## 2017-08-24 NOTE — PROGRESS NOTES
Group Therapy Progress Notes     Area of Treatment Focus:  Symptom Management and Develop / Improve Independent Living Skills    Therapeutic Interventions/Treatment Strategies:  Support, Feedback, Education and Cognitive Behavioral Therapy    Response to Treatment Strategies:  Accepted Feedback, Gave Feedback, Listened and Focused on Goals    Name of Group:  Anxiety Lab    Progress Note  Discussed impact of thinking about past and future on anxiety and emotional stress levels, as well as option of using a here/now focus to shift away from this type of triggering thinking. Reviewed ways to practice mindfulness that can promote that shift to the moment to moment focus.          Is this a Weekly Review of the Progress on the Treatment Plan?  No

## 2017-08-24 NOTE — PROGRESS NOTES
Adult Mental Health   Partial Hospitalization Program Progress Note     Client Initial Individualized Goals for Treatment:  stronger routine, second opinion about my medications, having a job I enjoy, strategies to lessen the depression.         See Initial Treatment suggestions for the client during the time between Diagnostic Assessment and completion of the Master Individualized Treatment Plan.    Treatment Goals:     Follow Safety Plan   Ask for more information, support and/or assistance as needed.  Follow up with providers/community supports as needed[facility]  Report increases or changes in symptoms to staff.  Report any personal safety concerns to staff.   Take medications as prescribed.  Report medication changes and/or side effects to staff.  Attend and participate in groups as scheduled or notify staff if unable to do so.  Report any use of substances to staff as this may impact your symptoms and/or personal safety.  Notify staff if you have any other issues that need to be addressed. This may include any current abuse / neglect / exploitation or other vulnerability.  Follow recommendations of your treatment team and discuss concerns if not in agreement.           Area of Treatment Focus:  Symptom Management, Community Resources/Discharge Planning, Develop / Improve Independent Living Skills and Physical Health     Therapeutic Interventions/Treatment Strategies:  Structured Activity, Problem Solving and Education  Accepted Feedback, Listened, Attentive and Alert  Response to Treatment Strategies:      Name of Group:  Medication Education    Description and Outcome:  The game Camiloo was used to facilitate discussion about medication safety. Topics on antidepressants, medication safety, side effects, pharmacy visits and neuroleptics were discussed.    Assessment  Mood: Calm behavior, range in affect, stable mood throughout group  Thought Process: Linear and  logical, productive and goal directed  Behavior: Cooperative, interacted within the group, listened and was respectful of others  Was able to participate and engage in discussion.     Is this a Weekly Review of the Progress on the Treatment Plan?  No

## 2017-08-25 NOTE — PROGRESS NOTES
Adult Mental Health Partial Hospitalization Group Therapy Progress Note     Date: 8/24/17    Client Initial Individualized Goals for Treatment: stronger routine, second opinion about my medications, having a job I enjoy, strategies to lessen the depression.     Treatment Goals:  1) Client will use coping plan for safety, as needed  2)  In OT life skills Sarah will learn, develop, explore 2-3 skills/strategies that support an increase in self-awareness and self-care including self-regulation skills, values identification, and life balance.  3) De will process current stressors and their relationship to her symptoms. Identifying barriers to having a positive relationship with herself.  4) De will learn, explore, and problem solve around issues around sleep hygiene and learn about impact of medication on her sleep habits.   5) Sarah will make aftercare appointments with Dr. Blum at [facility] and therapist, Jeanette Carroll at [facility]. She will continue naming her needs of her coping cards which is a relapse prevention tool. SP      Area of Treatment Focus:  Symptom Management, Personal Safety, Community Resources/Discharge Planning, Develop / Improve Independent Living Skills and Develop Socialization / Interpersonal Relationship Skills    Therapeutic Interventions/Treatment Strategies:  Support, Redirection, Feedback, Limit/Boundaries, Safety Assessments, Structured Activity, Problem Solving, Clarification and Education    Response to Treatment Strategies:  Accepted Feedback, Gave Feedback, Listened, Focused on Goals, Attentive, Accepted Support and Alert    Name of Group:  OT life skills/mental health management    Description and Outcome: De, attended and participated in a structured OT life skills group where intervention focuses on learning, practicing, and applying skills and strategies through psycho-education and structured experiential activities to mange mental health,  improve symptoms, and function  in valued roles, routines, and relationships. Today the focus is on life balance. Emphasis on identifying most meaningful areas of their lives that have gotten out of balance. Including identifying ways to increase/improve participation in valued areas. Particular focus on self-care/compassion first. The strength of using the stovetop metaphor as it strengthens the clients awareness of that they have control and that by engaging in their psychiatric recovery thay are re-establishing control of things they can. De fully engages identifies her focus areas as self care, relationships, hobbies, physical health management, and work related issues. She worked to identify what skills or strategies she can use to help gain forde control of these important aspects of her life. Affect even. Client demonstrated understanding of session content by effectively engaging in the structured task and endorsed increased self-awareness. Writer reviewed the results of her Adult Sensory Profile, provided resources and ideas for her to continue on exploring her sensory preferences for self-regulation and education on potential activities to try.      Is this a Weekly Review of the Progress on the Treatment Plan?  Yes.      Are Treatment Plan Goals being addressed?  Client discharged      Are Treatment Plan Strategies to Address Goals Effective?  Client discharged      Are there any current contracts in place?  No

## 2017-08-25 NOTE — PROGRESS NOTES
Group Therapy Progress Notes     Area of Treatment Focus:  Symptom Management and Develop Socialization / Interpersonal Relationship Skills    Therapeutic Interventions/Treatment Strategies:  Support, Feedback, Structured Activity and Education    Response to Treatment Strategies:  Accepted Feedback, Gave Feedback, Listened, Focused on Goals, Attentive, Accepted Support and Alert    Name of Group:  Self awareness    Progress Note  Group members were presented with structure for identifying movement preferences and thereby increasing self awareness.  They were introduced to Laban's movement observation scale and starting with breathing and moving into use of space explored a range of movement efforts.  Madny identified moving with direction felt most comfortable to her.  She relates this to other areas in life in which she feels the importance of direction.  Encouraged Mandy to explore indirection in movement, as a way of becoming more comfortable with indirect when it arises in life.          Is this a Weekly Review of the Progress on the Treatment Plan?  No